# Patient Record
Sex: FEMALE | Race: WHITE | Employment: FULL TIME | URBAN - METROPOLITAN AREA
[De-identification: names, ages, dates, MRNs, and addresses within clinical notes are randomized per-mention and may not be internally consistent; named-entity substitution may affect disease eponyms.]

---

## 2021-01-19 ENCOUNTER — HOSPITAL ENCOUNTER (EMERGENCY)
Age: 56
Discharge: HOME | End: 2021-01-20
Payer: COMMERCIAL

## 2021-01-19 ENCOUNTER — APPOINTMENT (OUTPATIENT)
Dept: CT IMAGING | Age: 56
End: 2021-01-19
Payer: COMMERCIAL

## 2021-01-19 ENCOUNTER — APPOINTMENT (OUTPATIENT)
Dept: GENERAL RADIOLOGY | Age: 56
End: 2021-01-19
Payer: COMMERCIAL

## 2021-01-19 DIAGNOSIS — R31.9 URINARY TRACT INFECTION WITH HEMATURIA, SITE UNSPECIFIED: ICD-10-CM

## 2021-01-19 DIAGNOSIS — K80.20 CALCULUS OF GALLBLADDER WITHOUT CHOLECYSTITIS WITHOUT OBSTRUCTION: Primary | ICD-10-CM

## 2021-01-19 DIAGNOSIS — N39.0 URINARY TRACT INFECTION WITH HEMATURIA, SITE UNSPECIFIED: ICD-10-CM

## 2021-01-19 DIAGNOSIS — Z20.822 SUSPECTED COVID-19 VIRUS INFECTION: ICD-10-CM

## 2021-01-19 LAB
A/G RATIO: 1.3 (ref 1.1–2.2)
ALBUMIN SERPL-MCNC: 4 G/DL (ref 3.4–5)
ALP BLD-CCNC: 107 U/L (ref 40–129)
ALT SERPL-CCNC: 38 U/L (ref 10–40)
ANION GAP SERPL CALCULATED.3IONS-SCNC: 12 MMOL/L (ref 3–16)
AST SERPL-CCNC: 31 U/L (ref 15–37)
BACTERIA: ABNORMAL /HPF
BASOPHILS ABSOLUTE: 0.1 K/UL (ref 0–0.2)
BASOPHILS RELATIVE PERCENT: 1.1 %
BILIRUB SERPL-MCNC: 0.7 MG/DL (ref 0–1)
BILIRUBIN URINE: NEGATIVE
BLOOD, URINE: NEGATIVE
BUN BLDV-MCNC: 14 MG/DL (ref 7–20)
CALCIUM SERPL-MCNC: 8.5 MG/DL (ref 8.3–10.6)
CHLORIDE BLD-SCNC: 97 MMOL/L (ref 99–110)
CLARITY: CLEAR
CO2: 27 MMOL/L (ref 21–32)
COLOR: YELLOW
CREAT SERPL-MCNC: 1 MG/DL (ref 0.6–1.1)
EOSINOPHILS ABSOLUTE: 0 K/UL (ref 0–0.6)
EOSINOPHILS RELATIVE PERCENT: 0.4 %
EPITHELIAL CELLS, UA: ABNORMAL /HPF (ref 0–5)
GFR AFRICAN AMERICAN: >60
GFR NON-AFRICAN AMERICAN: 57
GLOBULIN: 3 G/DL
GLUCOSE BLD-MCNC: 141 MG/DL (ref 70–99)
GLUCOSE URINE: NEGATIVE MG/DL
HCT VFR BLD CALC: 36.3 % (ref 36–48)
HEMOGLOBIN: 12.3 G/DL (ref 12–16)
HYALINE CASTS: ABNORMAL /LPF (ref 0–2)
KETONES, URINE: NEGATIVE MG/DL
LEUKOCYTE ESTERASE, URINE: ABNORMAL
LIPASE: 71 U/L (ref 13–60)
LYMPHOCYTES ABSOLUTE: 1.5 K/UL (ref 1–5.1)
LYMPHOCYTES RELATIVE PERCENT: 23.9 %
MCH RBC QN AUTO: 27.6 PG (ref 26–34)
MCHC RBC AUTO-ENTMCNC: 33.8 G/DL (ref 31–36)
MCV RBC AUTO: 81.8 FL (ref 80–100)
MICROSCOPIC EXAMINATION: YES
MONOCYTES ABSOLUTE: 0.4 K/UL (ref 0–1.3)
MONOCYTES RELATIVE PERCENT: 6.9 %
NEUTROPHILS ABSOLUTE: 4.4 K/UL (ref 1.7–7.7)
NEUTROPHILS RELATIVE PERCENT: 67.7 %
NITRITE, URINE: NEGATIVE
PDW BLD-RTO: 14.8 % (ref 12.4–15.4)
PH UA: 6 (ref 5–8)
PLATELET # BLD: 230 K/UL (ref 135–450)
PMV BLD AUTO: 9.5 FL (ref 5–10.5)
POTASSIUM SERPL-SCNC: 4.2 MMOL/L (ref 3.5–5.1)
PROTEIN UA: NEGATIVE MG/DL
RBC # BLD: 4.44 M/UL (ref 4–5.2)
RBC UA: ABNORMAL /HPF (ref 0–4)
REASON FOR REJECTION: NORMAL
REJECTED TEST: NORMAL
SODIUM BLD-SCNC: 136 MMOL/L (ref 136–145)
SPECIFIC GRAVITY UA: 1.01 (ref 1–1.03)
TOTAL PROTEIN: 7 G/DL (ref 6.4–8.2)
TROPONIN: <0.01 NG/ML
URINE TYPE: ABNORMAL
UROBILINOGEN, URINE: 0.2 E.U./DL
WBC # BLD: 6.5 K/UL (ref 4–11)
WBC UA: ABNORMAL /HPF (ref 0–5)

## 2021-01-19 PROCEDURE — 81001 URINALYSIS AUTO W/SCOPE: CPT

## 2021-01-19 PROCEDURE — 83690 ASSAY OF LIPASE: CPT

## 2021-01-19 PROCEDURE — 80053 COMPREHEN METABOLIC PANEL: CPT

## 2021-01-19 PROCEDURE — 87077 CULTURE AEROBIC IDENTIFY: CPT

## 2021-01-19 PROCEDURE — 87086 URINE CULTURE/COLONY COUNT: CPT

## 2021-01-19 PROCEDURE — 84484 ASSAY OF TROPONIN QUANT: CPT

## 2021-01-19 PROCEDURE — 85025 COMPLETE CBC W/AUTO DIFF WBC: CPT

## 2021-01-19 PROCEDURE — 2580000003 HC RX 258: Performed by: PHYSICIAN ASSISTANT

## 2021-01-19 PROCEDURE — 74177 CT ABD & PELVIS W/CONTRAST: CPT

## 2021-01-19 PROCEDURE — 6360000004 HC RX CONTRAST MEDICATION: Performed by: PHYSICIAN ASSISTANT

## 2021-01-19 PROCEDURE — 71045 X-RAY EXAM CHEST 1 VIEW: CPT

## 2021-01-19 PROCEDURE — 87186 SC STD MICRODIL/AGAR DIL: CPT

## 2021-01-19 PROCEDURE — 6360000002 HC RX W HCPCS: Performed by: PHYSICIAN ASSISTANT

## 2021-01-19 PROCEDURE — 99284 EMERGENCY DEPT VISIT MOD MDM: CPT

## 2021-01-19 PROCEDURE — 96374 THER/PROPH/DIAG INJ IV PUSH: CPT

## 2021-01-19 PROCEDURE — 93005 ELECTROCARDIOGRAM TRACING: CPT | Performed by: PHYSICIAN ASSISTANT

## 2021-01-19 RX ORDER — LOSARTAN POTASSIUM 100 MG/1
1 TABLET ORAL DAILY
COMMUNITY
Start: 2020-09-04

## 2021-01-19 RX ORDER — CEPHALEXIN 250 MG/1
500 CAPSULE ORAL EVERY 6 HOURS SCHEDULED
Status: DISCONTINUED | OUTPATIENT
Start: 2021-01-20 | End: 2021-01-20 | Stop reason: HOSPADM

## 2021-01-19 RX ORDER — 0.9 % SODIUM CHLORIDE 0.9 %
1000 INTRAVENOUS SOLUTION INTRAVENOUS ONCE
Status: COMPLETED | OUTPATIENT
Start: 2021-01-19 | End: 2021-01-19

## 2021-01-19 RX ORDER — SEMAGLUTIDE 1.34 MG/ML
0.5 INJECTION, SOLUTION SUBCUTANEOUS WEEKLY
COMMUNITY
Start: 2020-10-07

## 2021-01-19 RX ORDER — ONDANSETRON 2 MG/ML
4 INJECTION INTRAMUSCULAR; INTRAVENOUS ONCE
Status: COMPLETED | OUTPATIENT
Start: 2021-01-19 | End: 2021-01-19

## 2021-01-19 RX ORDER — ATORVASTATIN CALCIUM 40 MG/1
1 TABLET, FILM COATED ORAL DAILY
COMMUNITY
Start: 2020-09-04

## 2021-01-19 RX ADMIN — ONDANSETRON 4 MG: 2 INJECTION INTRAMUSCULAR; INTRAVENOUS at 22:10

## 2021-01-19 RX ADMIN — IOPAMIDOL 75 ML: 755 INJECTION, SOLUTION INTRAVENOUS at 22:53

## 2021-01-19 RX ADMIN — SODIUM CHLORIDE 1000 ML: 9 INJECTION, SOLUTION INTRAVENOUS at 22:11

## 2021-01-20 VITALS
WEIGHT: 220 LBS | TEMPERATURE: 98.4 F | DIASTOLIC BLOOD PRESSURE: 71 MMHG | OXYGEN SATURATION: 94 % | HEART RATE: 84 BPM | HEIGHT: 66 IN | SYSTOLIC BLOOD PRESSURE: 128 MMHG | BODY MASS INDEX: 35.36 KG/M2 | RESPIRATION RATE: 16 BRPM

## 2021-01-20 LAB
EKG ATRIAL RATE: 94 BPM
EKG DIAGNOSIS: NORMAL
EKG P AXIS: 24 DEGREES
EKG P-R INTERVAL: 148 MS
EKG Q-T INTERVAL: 362 MS
EKG QRS DURATION: 98 MS
EKG QTC CALCULATION (BAZETT): 452 MS
EKG R AXIS: -12 DEGREES
EKG T AXIS: 49 DEGREES
EKG VENTRICULAR RATE: 94 BPM
SARS-COV-2: DETECTED

## 2021-01-20 PROCEDURE — 93010 ELECTROCARDIOGRAM REPORT: CPT | Performed by: INTERNAL MEDICINE

## 2021-01-20 PROCEDURE — U0003 INFECTIOUS AGENT DETECTION BY NUCLEIC ACID (DNA OR RNA); SEVERE ACUTE RESPIRATORY SYNDROME CORONAVIRUS 2 (SARS-COV-2) (CORONAVIRUS DISEASE [COVID-19]), AMPLIFIED PROBE TECHNIQUE, MAKING USE OF HIGH THROUGHPUT TECHNOLOGIES AS DESCRIBED BY CMS-2020-01-R: HCPCS

## 2021-01-20 PROCEDURE — 6370000000 HC RX 637 (ALT 250 FOR IP): Performed by: PHYSICIAN ASSISTANT

## 2021-01-20 RX ORDER — ONDANSETRON 4 MG/1
4 TABLET, ORALLY DISINTEGRATING ORAL EVERY 8 HOURS PRN
Qty: 30 TABLET | Refills: 0 | Status: SHIPPED | OUTPATIENT
Start: 2021-01-20

## 2021-01-20 RX ORDER — CEPHALEXIN 500 MG/1
500 CAPSULE ORAL 3 TIMES DAILY
Qty: 21 CAPSULE | Refills: 0 | Status: SHIPPED | OUTPATIENT
Start: 2021-01-20 | End: 2021-01-27

## 2021-01-20 RX ADMIN — CEPHALEXIN 500 MG: 250 CAPSULE ORAL at 00:19

## 2021-01-20 NOTE — ED PROVIDER NOTES
EKG: Sinus rhythm rate of 94 bpm.  Inferior Q waves. No ST elevation.       Monserrat Hines MD  01/19/21 7084

## 2021-01-20 NOTE — ED PROVIDER NOTES
[address] [facility] Emergency Department    CHIEF COMPLAINT  Abdominal Pain (L sided when she eats for 3 weeks / was told that she might have gallstones, unable to eat/drink, was sent by PCP)      [facility] SERVICE VISIT  Evaluated by MEGA. My supervising physician was available for consultation. EKG interpretation provided by attending physician. HISTORY OF PRESENT ILLNESS  Melinda Yeung is a 60 y.o. female who presents to the ED complaining of several week history of abdominal discomfort. Patient dropped off for evaluation. Patient states that she has had some left-sided abdominal discomfort for past several weeks which appears worse after eating. States that she has not been eating much secondary to pain. Has also had some nausea with vomiting. No diarrhea or constipation. She denies chest pain or shortness of breath. No cough. No headache, lightheadedness, dizziness or confusion. No leg pain or swelling. No recent travel, trauma or surgery. Denies abdominal surgeries. Denies alcohol use. Has had no fevers or chills. No other complaints, modifying factors or associated symptoms. Nursing notes reviewed. Past Medical History:   Diagnosis Date    CHF (congestive heart failure) (HCC)     GERD (gastroesophageal reflux disease)     Hypertension      Past Surgical History:   Procedure Laterality Date     SECTION       History reviewed. No pertinent family history.   Social History     Socioeconomic History    Marital status:      Spouse name: Not on file    Number of children: Not on file    Years of education: Not on file    Highest education level: Not on file   Occupational History    Not on file   Social Needs    Financial resource strain: Not on file    Food insecurity     Worry: Not on file     Inability: Not on file    Transportation needs     Medical: Not on file     Non-medical: Not on file   Tobacco Use  Smoking status: Never Smoker    Smokeless tobacco: Never Used   Substance and Sexual Activity    Alcohol use: No    Drug use: No    Sexual activity: Yes   Lifestyle    Physical activity     Days per week: Not on file     Minutes per session: Not on file    Stress: Not on file   Relationships    Social connections     Talks on phone: Not on file     Gets together: Not on file     Attends Yazdanism service: Not on file     Active member of club or organization: Not on file     Attends meetings of clubs or organizations: Not on file     Relationship status: Not on file    Intimate partner violence     Fear of current or ex partner: Not on file     Emotionally abused: Not on file     Physically abused: Not on file     Forced sexual activity: Not on file   Other Topics Concern    Not on file   Social History Narrative    Not on file     Current Facility-Administered Medications   Medication Dose Route Frequency Provider Last Rate Last Admin    cephALEXin (KEFLEX) capsule 500 mg  500 mg Oral 4 times per day FERNANDO Harman   500 mg at 01/20/21 0019     Current Outpatient Medications   Medication Sig Dispense Refill    ondansetron (ZOFRAN ODT) 4 MG disintegrating tablet Take 1 tablet by mouth every 8 hours as needed for Nausea or Vomiting 30 tablet 0    cephALEXin (KEFLEX) 500 MG capsule Take 1 capsule by mouth 3 times daily for 7 days 21 capsule 0    atorvastatin (LIPITOR) 40 MG tablet Take 1 tablet by mouth daily      metFORMIN (GLUCOPHAGE) 500 MG tablet Take 2 tablets by mouth 2 times daily      losartan (COZAAR) 100 MG tablet Take 1 tablet by mouth daily      Semaglutide,0.25 or 0.5MG/DOS, (OZEMPIC, 0.25 OR 0.5 MG/DOSE,) 2 MG/1.5ML SOPN Inject 0.5 mg into the skin once a week      carvedilol (COREG) 12.5 MG tablet Take 12.5 mg by mouth 2 times daily (with meals)      potassium chloride 10 MEQ/100ML Infuse 10 mEq intravenously once  Mag Aspart-Potassium Aspart (POTASSIUM & MAGNESIUM ASPARTAT) 250-250 MG CAPS Take by mouth      HYDROcodone-acetaminophen (NORCO) 5-325 MG per tablet Take 1 tablet by mouth every 8 hours as needed for Pain 15 tablet 0    naproxen (NAPROSYN) 500 MG tablet Take 1 tablet by mouth 2 times daily 30 tablet 0    aspirin 81 MG EC tablet Take 1 tablet by mouth daily.  esomeprazole (NEXIUM) 40 MG capsule Take 40 mg by mouth every morning (before breakfast).  prenatal vitamin (PRENATAL-S) 27-0.8 MG TABS Take 1 tablet by mouth daily. No Known Allergies    REVIEW OF SYSTEMS  10 systems reviewed, pertinent positives per HPI otherwise noted to be negative    PHYSICAL EXAM  /75   Pulse 91   Temp 99.1 °F (37.3 °C) (Oral)   Resp 17   Ht 5' 6\" (1.676 m)   Wt 220 lb (99.8 kg)   LMP 02/12/2016   SpO2 98%   BMI 35.51 kg/m²   GENERAL APPEARANCE: Awake and alert. Cooperative. No acute distress. HEAD: Normocephalic. Atraumatic. EYES: PERRL. EOM's grossly intact. ENT: Mucous membranes are moist.   NECK: Supple. No JVD. No tracheal tenderness or deviation. No crepitus. HEART: RRR. No murmurs. No chest wall tenderness. LUNGS: Respirations unlabored. CTAB. Good air exchange. Speaking comfortably in full sentences. No wheezing, rhonchi, rales. ABDOMEN: Soft. Non-distended. Upper abdominal tenderness without rigidity,  guarding or rebound. Negative Sharp's, McBurney's, Rovsing's. No fluids or ascites. No hernias or masses. Bowel sounds normal in all quadrants. No CVA tenderness. EXTREMITIES: No peripheral edema. Moves all extremities equally. All extremities neurovascularly intact. SKIN: Warm and dry. No acute rashes. NEUROLOGICAL: Alert and oriented. CN's 2-12 intact. No gross facial drooping. Strength 5/5, sensation intact. PSYCHIATRIC: Normal mood and affect.     RADIOLOGY  Ct Abdomen Pelvis W Iv Contrast Additional Contrast? None    Result Date: 1/19/2021 EXAMINATION: CT OF THE ABDOMEN AND PELVIS WITH CONTRAST 2021 9:49 pm TECHNIQUE: CT of the abdomen and pelvis was performed with the administration of intravenous contrast. Multiplanar reformatted images are provided for review. Dose modulation, iterative reconstruction, and/or weight based adjustment of the mA/kV was utilized to reduce the radiation dose to as low as reasonably achievable. COMPARISON: None. HISTORY: ORDERING SYSTEM PROVIDED HISTORY: abd pain TECHNOLOGIST PROVIDED HISTORY: Reason for exam:->abd pain Additional Contrast?->None Reason for Exam: LUQ pain that has been inconsistent for about 3 weeks. Currently does not have any pain Acuity: Acute Type of Exam: Initial Relevant Medical/Surgical History: hx of  FINDINGS: Lower Chest: Bibasilar ground-glass opacities are present. Differential considerations would include atypical/viral pneumonia. Trace bilateral pleural effusions are present, with subsegmental atelectasis present bilaterally. Small pericardial effusion is present. Calcified hilar lymph nodes are present bilaterally. Organs: Mild degree of hepatic steatosis is present. Cholelithiasis is present, without evidence of cholecystitis. The pancreas, spleen, adrenal glands, appear normal.  Cortical cyst formation is present on the left kidney. No intrarenal calculi or hydronephrosis is present. GI/Bowel: Stomach appears normal.  Small bowel appears normal without evidence of obstruction. No evidence of appendicitis is present. Scattered colonic diverticula are noted, without evidence of diverticulitis. Pelvis: Urinary bladder is nondistended. Uterus and adnexal regions appear normal. Peritoneum/Retroperitoneum: No free fluid or free air is present within the abdomen or pelvis. No aneurysm formation is present. No pathological adenopathy is present. Bones/Soft Tissues: No acute osseous abnormalities present. 1. Bibasilar ground-glass opacities, trace pleural effusions, and subsegmental atelectasis. Differential considerations for the ground-glass opacities would include atypical/viral 1 pneumonia 2. Cholelithiasis, without evidence of cholecystitis 3. Scattered colonic diverticula, without evidence of diverticulitis     Xr Chest Portable    Result Date: 1/19/2021  EXAMINATION: ONE XRAY VIEW OF THE CHEST 1/19/2021 9:02 pm COMPARISON: March 6, 2011 HISTORY: ORDERING SYSTEM PROVIDED HISTORY: n/v TECHNOLOGIST PROVIDED HISTORY: Reason for exam:->n/v Reason for Exam: n/v Acuity: Acute Type of Exam: Initial FINDINGS: Marginal inspiration is present. Faint opacities are present bilaterally, and may represent atypical/viral pneumonia versus bacterial pneumonia and atelectasis. Calcified granuloma is again visualized within the left upper lobe. Heart size is normal full level of inspiration. Tortuosity of the aorta is present. Fullness of the hilar regions is again visualized, similar compared to the prior study. Osseous structures are stable. Marginal inspiration, with faint opacities bilaterally.   Differential considerations would include atypical/viral pneumonia versus multifocal bacterial pneumonia and atelectasis     ED COURSE Patient received Zofran for pain, with good relief. Triage vitals showing slight tachycardia pulse rate 112. Low-grade temperature. Given a 1 L IV fluid bolus. Microscopic urinalysis with 21-50 white blood cells and 2+ bacteria. Given dose of Keflex. Urine cultures pending. CMP suggestive of some mild dehydration with decreased GFR at 57. BUN and creatinine 14 and 1.0. Lipase 71. Troponin less than 0.01. CBC without leukocytosis or anemia. Chest x-ray showing faint opacities bilaterally atypical/viral pneumonia versus multifocal bacterial pneumonia and atelectasis. EKG normal sinus rhythm without evidence for acute ischemic change. CT abdomen and pelvis confirming gallstones without evidence to suggest infectious process. Groundglass opacities again with potential for atypical or viral pneumonia. Patient tolerated p.o. challenge. Tachycardia and fever has resolved on reevaluation. She does feel comfortable with discharge home with general surgery referral for gallstones. Also sent home with antibiotics for UTI. Covid testing pending. Return precautions discussed and patient is in agreement and comfortable at discharge. A discussion was had with MsKonstantin Burnie Osler regarding abdominal pain, ED findings and recommendations for follow-up. Risk management discussed and shared decision making had with patient and/or surrogate. All questions were answered. Patient will follow up with PCP and general surgeon within 2 to 3 days for further evaluation/treatment. All questions answered. Patient will return to ED for new/worsening symptoms. Patient was sent home with a prescription for Zofran and Keflex.     MDM  Results for orders placed or performed during the hospital encounter of 01/19/21   CBC auto differential   Result Value Ref Range    WBC 6.5 4.0 - 11.0 K/uL    RBC 4.44 4.00 - 5.20 M/uL    Hemoglobin 12.3 12.0 - 16.0 g/dL    Hematocrit 36.3 36.0 - 48.0 %    MCV 81.8 80.0 - 100.0 fL MCH 27.6 26.0 - 34.0 pg    MCHC 33.8 31.0 - 36.0 g/dL    RDW 14.8 12.4 - 15.4 %    Platelets 780 727 - 846 K/uL    MPV 9.5 5.0 - 10.5 fL    Neutrophils % 67.7 %    Lymphocytes % 23.9 %    Monocytes % 6.9 %    Eosinophils % 0.4 %    Basophils % 1.1 %    Neutrophils Absolute 4.4 1.7 - 7.7 K/uL    Lymphocytes Absolute 1.5 1.0 - 5.1 K/uL    Monocytes Absolute 0.4 0.0 - 1.3 K/uL    Eosinophils Absolute 0.0 0.0 - 0.6 K/uL    Basophils Absolute 0.1 0.0 - 0.2 K/uL   SPECIMEN REJECTION   Result Value Ref Range    Rejected Test cmp     Reason for Rejection see below    Comprehensive Metabolic Panel   Result Value Ref Range    Sodium 136 136 - 145 mmol/L    Potassium 4.2 3.5 - 5.1 mmol/L    Chloride 97 (L) 99 - 110 mmol/L    CO2 27 21 - 32 mmol/L    Anion Gap 12 3 - 16    Glucose 141 (H) 70 - 99 mg/dL    BUN 14 7 - 20 mg/dL    CREATININE 1.0 0.6 - 1.1 mg/dL    GFR Non- 57 (A) >60    GFR African American >60 >60    Calcium 8.5 8.3 - 10.6 mg/dL    Total Protein 7.0 6.4 - 8.2 g/dL    Alb 4.0 3.4 - 5.0 g/dL    Albumin/Globulin Ratio 1.3 1.1 - 2.2    Total Bilirubin 0.7 0.0 - 1.0 mg/dL    Alkaline Phosphatase 107 40 - 129 U/L    ALT 38 10 - 40 U/L    AST 31 15 - 37 U/L    Globulin 3.0 g/dL   Urinalysis   Result Value Ref Range    Color, UA Yellow Straw/Yellow    Clarity, UA Clear Clear    Glucose, Ur Negative Negative mg/dL    Bilirubin Urine Negative Negative    Ketones, Urine Negative Negative mg/dL    Specific Gravity, UA 1.010 1.005 - 1.030    Blood, Urine Negative Negative    pH, UA 6.0 5.0 - 8.0    Protein, UA Negative Negative mg/dL    Urobilinogen, Urine 0.2 <2.0 E.U./dL    Nitrite, Urine Negative Negative    Leukocyte Esterase, Urine SMALL (A) Negative    Microscopic Examination YES     Urine Type NotGiven    Lipase   Result Value Ref Range    Lipase 71.0 (H) 13.0 - 60.0 U/L   Troponin   Result Value Ref Range    Troponin <0.01 <0.01 ng/mL   Microscopic Urinalysis   Result Value Ref Range Hyaline Casts, UA 0-2 0 - 2 /LPF    WBC, UA 21-50 (A) 0 - 5 /HPF    RBC, UA 0-2 0 - 4 /HPF    Epithelial Cells, UA 2-5 0 - 5 /HPF    Bacteria, UA 2+ (A) None Seen /HPF   EKG 12 Lead   Result Value Ref Range    Ventricular Rate 94 BPM    Atrial Rate 94 BPM    P-R Interval 148 ms    QRS Duration 98 ms    Q-T Interval 362 ms    QTc Calculation (Bazett) 452 ms    P Axis 24 degrees    R Axis -12 degrees    T Axis 49 degrees    Diagnosis       Normal sinus rhythmInferior infarct , age undeterminedAbnormal ECGWhen compared with ECG of 08-MAR-2011 06:07,T wave inversion no longer evident in Anterolateral leads     I estimate there is LOW risk for ACUTE APPENDICITIS, BOWEL OBSTRUCTION, CHOLECYSTITIS, DIVERTICULITIS, INCARCERATED HERNIA, PANCREATITIS, or PERFORATED BOWEL or ULCER, thus I consider the discharge disposition reasonable. Also, there is no evidence or peritonitis, sepsis, or toxicity. Viktor Barnett and I have discussed the diagnosis and risks, and we agree with discharging home to follow-up with their primary doctor. We also discussed returning to the Emergency Department immediately if new or worsening symptoms occur. We have discussed the symptoms which are most concerning (e.g., bloody stool, fever, changing or worsening pain, vomiting) that necessitate immediate return. FINAL Impression  1. Calculus of gallbladder without cholecystitis without obstruction    2. Urinary tract infection with hematuria, site unspecified    3. Suspected COVID-19 virus infection      Blood pressure 124/75, pulse 91, temperature 99.1 °F (37.3 °C), temperature source Oral, resp. rate 17, height 5' 6\" (1.676 m), weight 220 lb (99.8 kg), last menstrual period 02/12/2016, SpO2 98 %. DISPOSITION  Patient was discharged to home in good condition.          Odette Barrow, [address]  01/20/21 9781

## 2021-01-20 NOTE — ED NOTES
Discharge instructions reviewed with patient. Reviewed prescriptions with patient. No additional questions asked. Voiced understanding. Encouraged patient to follow up as discussed by the ED physician.      Gale Frausto RN  01/20/21 5541

## 2021-01-21 ENCOUNTER — CARE COORDINATION (OUTPATIENT)
Dept: CARE COORDINATION | Age: 56
End: 2021-01-21

## 2021-01-21 LAB
ORGANISM: ABNORMAL
URINE CULTURE, ROUTINE: ABNORMAL

## 2021-01-21 NOTE — CARE COORDINATION
Patient contacted regarding [id number] diagnosis\". Discussed COVID-19 related testing which was available at this time. Test results were positive. Patient informed of results, if available? Yes    Care Transition Nurse/ Ambulatory Care Manager contacted the patient by telephone to perform post discharge assessment. Call within 2 business days of discharge: Yes. Verified name and  with patient as identifiers. Provided introduction to self, and explanation of the CTN/ACM role, and reason for call due to risk factors for infection and/or exposure to COVID-19. Symptoms reviewed with patient who verbalized the following symptoms: no new symptoms and no worsening symptoms. Denies any concerns/symptoms. Pt states her focus is on health of her kids, specifically as r/t school d/t family self-quarantined. Due to no new or worsening symptoms encounter was not routed to provider for escalation. Discussed follow-up appointments. If no appointment was previously scheduled, appointment scheduling offered: Yes  [facility] follow up appointment(s): No future appointments. Non-[facility] follow up appointment(s):     Non-face-to-face services provided:  Obtained and reviewed discharge summary and/or continuity of care documents     Advance Care Planning:   Does patient have an Advance Directive:  not on file; education provided. Patient has following risk factors of: heart failure. CTN/ACM reviewed discharge instructions, medical action plan and red flags such as increased shortness of breath, increasing fever and signs of decompensation with patient who verbalized understanding. Discussed exposure protocols and quarantine with CDC Guidelines What to do if you are sick with coronavirus disease 2019.  Patient was given an opportunity for questions and concerns.  The patient agrees to contact the [facility] exposure line [phone], [facility] department [place] Department of Health: ([phone]) and PCP office for

## 2021-01-21 NOTE — CARE COORDINATION
Care Transition phone outreach s/p acute care visit 1.20.21  Left HIPAA compliant vm requesting return callback. Provided & repeated direct contact number to reach this nurse.

## 2021-01-21 NOTE — RESULT ENCOUNTER NOTE
Culture result reviewed, no further treatment needed. [facility] Smokers Quitline ([phone])

## 2023-01-13 ENCOUNTER — APPOINTMENT (OUTPATIENT)
Dept: CT IMAGING | Age: 58
DRG: 412 | End: 2023-01-13
Payer: COMMERCIAL

## 2023-01-13 ENCOUNTER — HOSPITAL ENCOUNTER (INPATIENT)
Age: 58
LOS: 4 days | Discharge: HOME | DRG: 412 | End: 2023-01-17
Attending: STUDENT IN AN ORGANIZED HEALTH CARE EDUCATION/TRAINING PROGRAM | Admitting: SURGERY
Payer: COMMERCIAL

## 2023-01-13 DIAGNOSIS — K81.0 ACUTE CHOLECYSTITIS: ICD-10-CM

## 2023-01-13 DIAGNOSIS — R74.01 TRANSAMINITIS: ICD-10-CM

## 2023-01-13 DIAGNOSIS — K80.50 BILIARY COLIC: ICD-10-CM

## 2023-01-13 DIAGNOSIS — G89.18 ACUTE POSTOPERATIVE PAIN OF ABDOMEN: ICD-10-CM

## 2023-01-13 DIAGNOSIS — R10.13 ABDOMINAL PAIN, EPIGASTRIC: Primary | ICD-10-CM

## 2023-01-13 DIAGNOSIS — R10.9 ACUTE POSTOPERATIVE PAIN OF ABDOMEN: ICD-10-CM

## 2023-01-13 LAB
A/G RATIO: 1 (ref 1.1–2.2)
ALBUMIN SERPL-MCNC: 3.8 G/DL (ref 3.4–5)
ALBUMIN SERPL-MCNC: 3.9 G/DL (ref 3.4–5)
ALP BLD-CCNC: 109 U/L (ref 40–129)
ALT SERPL-CCNC: 147 U/L (ref 10–40)
ANION GAP SERPL CALCULATED.3IONS-SCNC: 11 MMOL/L (ref 3–16)
ANION GAP SERPL CALCULATED.3IONS-SCNC: 17 MMOL/L (ref 3–16)
AST SERPL-CCNC: 364 U/L (ref 15–37)
BASOPHILS ABSOLUTE: 0.1 K/UL (ref 0–0.2)
BASOPHILS RELATIVE PERCENT: 0.5 %
BILIRUB SERPL-MCNC: 1.4 MG/DL (ref 0–1)
BILIRUB SERPL-MCNC: 2.8 MG/DL (ref 0–1)
BILIRUBIN DIRECT: 1.8 MG/DL (ref 0–0.3)
BILIRUBIN URINE: NEGATIVE
BILIRUBIN, INDIRECT: 1 MG/DL (ref 0–1)
BLOOD, URINE: NEGATIVE
BUN BLDV-MCNC: 15 MG/DL (ref 7–20)
BUN BLDV-MCNC: 18 MG/DL (ref 7–20)
CALCIUM SERPL-MCNC: 9.8 MG/DL (ref 8.3–10.6)
CALCIUM SERPL-MCNC: 9.9 MG/DL (ref 8.3–10.6)
CHLORIDE BLD-SCNC: 95 MMOL/L (ref 99–110)
CHLORIDE BLD-SCNC: 96 MMOL/L (ref 99–110)
CLARITY: CLEAR
CO2: 23 MMOL/L (ref 21–32)
CO2: 23 MMOL/L (ref 21–32)
COLOR: YELLOW
CREAT SERPL-MCNC: 0.7 MG/DL (ref 0.6–1.1)
CREAT SERPL-MCNC: 0.7 MG/DL (ref 0.6–1.1)
EOSINOPHILS ABSOLUTE: 0.1 K/UL (ref 0–0.6)
EOSINOPHILS RELATIVE PERCENT: 1.1 %
GFR SERPL CREATININE-BSD FRML MDRD: >60 ML/MIN/{1.73_M2}
GFR SERPL CREATININE-BSD FRML MDRD: >60 ML/MIN/{1.73_M2}
GLUCOSE BLD-MCNC: 204 MG/DL (ref 70–99)
GLUCOSE BLD-MCNC: 214 MG/DL (ref 70–99)
GLUCOSE URINE: NEGATIVE MG/DL
HCT VFR BLD CALC: 42.2 % (ref 36–48)
HEMOGLOBIN: 13.8 G/DL (ref 12–16)
KETONES, URINE: NEGATIVE MG/DL
LACTIC ACID: 4.1 MMOL/L (ref 0.4–2)
LACTIC ACID: 4.6 MMOL/L (ref 0.4–2)
LEUKOCYTE ESTERASE, URINE: NEGATIVE
LIPASE: 76 U/L (ref 13–60)
LYMPHOCYTES ABSOLUTE: 1.6 K/UL (ref 1–5.1)
LYMPHOCYTES RELATIVE PERCENT: 12.7 %
MCH RBC QN AUTO: 28.3 PG (ref 26–34)
MCHC RBC AUTO-ENTMCNC: 32.6 G/DL (ref 31–36)
MCV RBC AUTO: 86.7 FL (ref 80–100)
MICROSCOPIC EXAMINATION: NORMAL
MONOCYTES ABSOLUTE: 0.5 K/UL (ref 0–1.3)
MONOCYTES RELATIVE PERCENT: 4.2 %
NEUTROPHILS ABSOLUTE: 10.1 K/UL (ref 1.7–7.7)
NEUTROPHILS RELATIVE PERCENT: 81.5 %
NITRITE, URINE: NEGATIVE
PDW BLD-RTO: 15.2 % (ref 12.4–15.4)
PH UA: 6 (ref 5–8)
PHOSPHORUS: 3.1 MG/DL (ref 2.5–4.9)
PLATELET # BLD: 251 K/UL (ref 135–450)
PMV BLD AUTO: 9 FL (ref 5–10.5)
POTASSIUM REFLEX MAGNESIUM: 9.6 MMOL/L (ref 3.5–5.1)
POTASSIUM SERPL-SCNC: 3.8 MMOL/L (ref 3.5–5.1)
POTASSIUM SERPL-SCNC: 4.8 MMOL/L (ref 3.5–5.1)
PROTEIN UA: NEGATIVE MG/DL
RBC # BLD: 4.87 M/UL (ref 4–5.2)
SODIUM BLD-SCNC: 129 MMOL/L (ref 136–145)
SODIUM BLD-SCNC: 136 MMOL/L (ref 136–145)
SPECIFIC GRAVITY UA: <=1.005 (ref 1–1.03)
TOTAL PROTEIN: 7.8 G/DL (ref 6.4–8.2)
URINE REFLEX TO CULTURE: NORMAL
URINE TYPE: NORMAL
UROBILINOGEN, URINE: 0.2 E.U./DL
WBC # BLD: 12.4 K/UL (ref 4–11)

## 2023-01-13 PROCEDURE — 82247 BILIRUBIN TOTAL: CPT

## 2023-01-13 PROCEDURE — 2580000003 HC RX 258

## 2023-01-13 PROCEDURE — 83605 ASSAY OF LACTIC ACID: CPT

## 2023-01-13 PROCEDURE — 85025 COMPLETE CBC W/AUTO DIFF WBC: CPT

## 2023-01-13 PROCEDURE — 6360000002 HC RX W HCPCS

## 2023-01-13 PROCEDURE — 1200000000 HC SEMI PRIVATE

## 2023-01-13 PROCEDURE — 81003 URINALYSIS AUTO W/O SCOPE: CPT

## 2023-01-13 PROCEDURE — 96360 HYDRATION IV INFUSION INIT: CPT

## 2023-01-13 PROCEDURE — 74177 CT ABD & PELVIS W/CONTRAST: CPT

## 2023-01-13 PROCEDURE — 99285 EMERGENCY DEPT VISIT HI MDM: CPT

## 2023-01-13 PROCEDURE — 87150 DNA/RNA AMPLIFIED PROBE: CPT

## 2023-01-13 PROCEDURE — 36415 COLL VENOUS BLD VENIPUNCTURE: CPT

## 2023-01-13 PROCEDURE — 6360000002 HC RX W HCPCS: Performed by: STUDENT IN AN ORGANIZED HEALTH CARE EDUCATION/TRAINING PROGRAM

## 2023-01-13 PROCEDURE — 84132 ASSAY OF SERUM POTASSIUM: CPT

## 2023-01-13 PROCEDURE — 87040 BLOOD CULTURE FOR BACTERIA: CPT

## 2023-01-13 PROCEDURE — 6360000004 HC RX CONTRAST MEDICATION: Performed by: STUDENT IN AN ORGANIZED HEALTH CARE EDUCATION/TRAINING PROGRAM

## 2023-01-13 PROCEDURE — 80053 COMPREHEN METABOLIC PANEL: CPT

## 2023-01-13 PROCEDURE — 83690 ASSAY OF LIPASE: CPT

## 2023-01-13 PROCEDURE — 6370000000 HC RX 637 (ALT 250 FOR IP)

## 2023-01-13 PROCEDURE — 93005 ELECTROCARDIOGRAM TRACING: CPT | Performed by: SURGERY

## 2023-01-13 PROCEDURE — 82248 BILIRUBIN DIRECT: CPT

## 2023-01-13 PROCEDURE — 2580000003 HC RX 258: Performed by: STUDENT IN AN ORGANIZED HEALTH CARE EDUCATION/TRAINING PROGRAM

## 2023-01-13 PROCEDURE — 96361 HYDRATE IV INFUSION ADD-ON: CPT

## 2023-01-13 PROCEDURE — 99223 1ST HOSP IP/OBS HIGH 75: CPT | Performed by: SURGERY

## 2023-01-13 RX ORDER — SODIUM CHLORIDE 9 MG/ML
INJECTION, SOLUTION INTRAVENOUS PRN
Status: DISCONTINUED | OUTPATIENT
Start: 2023-01-13 | End: 2023-01-17 | Stop reason: HOSPADM

## 2023-01-13 RX ORDER — ONDANSETRON 2 MG/ML
4 INJECTION INTRAMUSCULAR; INTRAVENOUS EVERY 6 HOURS PRN
Status: DISCONTINUED | OUTPATIENT
Start: 2023-01-13 | End: 2023-01-17 | Stop reason: HOSPADM

## 2023-01-13 RX ORDER — SODIUM CHLORIDE 0.9 % (FLUSH) 0.9 %
10 SYRINGE (ML) INJECTION EVERY 12 HOURS SCHEDULED
Status: DISCONTINUED | OUTPATIENT
Start: 2023-01-13 | End: 2023-01-17 | Stop reason: HOSPADM

## 2023-01-13 RX ORDER — ENOXAPARIN SODIUM 100 MG/ML
40 INJECTION SUBCUTANEOUS DAILY
Status: CANCELLED | OUTPATIENT
Start: 2023-01-13

## 2023-01-13 RX ORDER — CARVEDILOL 12.5 MG/1
12.5 TABLET ORAL 2 TIMES DAILY WITH MEALS
Status: DISCONTINUED | OUTPATIENT
Start: 2023-01-14 | End: 2023-01-17 | Stop reason: HOSPADM

## 2023-01-13 RX ORDER — ENOXAPARIN SODIUM 100 MG/ML
40 INJECTION SUBCUTANEOUS DAILY
Status: DISCONTINUED | OUTPATIENT
Start: 2023-01-14 | End: 2023-01-14

## 2023-01-13 RX ORDER — ONDANSETRON 2 MG/ML
INJECTION INTRAMUSCULAR; INTRAVENOUS
Status: COMPLETED
Start: 2023-01-13 | End: 2023-01-13

## 2023-01-13 RX ORDER — ONDANSETRON 4 MG/1
4 TABLET, ORALLY DISINTEGRATING ORAL EVERY 8 HOURS PRN
Status: CANCELLED | OUTPATIENT
Start: 2023-01-13

## 2023-01-13 RX ORDER — SODIUM CHLORIDE 0.9 % (FLUSH) 0.9 %
5-40 SYRINGE (ML) INJECTION PRN
Status: DISCONTINUED | OUTPATIENT
Start: 2023-01-13 | End: 2023-01-17 | Stop reason: HOSPADM

## 2023-01-13 RX ORDER — ONDANSETRON 2 MG/ML
4 INJECTION INTRAMUSCULAR; INTRAVENOUS EVERY 6 HOURS PRN
Status: CANCELLED | OUTPATIENT
Start: 2023-01-13

## 2023-01-13 RX ORDER — ONDANSETRON 4 MG/1
4 TABLET, ORALLY DISINTEGRATING ORAL EVERY 8 HOURS PRN
Status: DISCONTINUED | OUTPATIENT
Start: 2023-01-13 | End: 2023-01-17 | Stop reason: HOSPADM

## 2023-01-13 RX ORDER — SODIUM CHLORIDE, SODIUM LACTATE, POTASSIUM CHLORIDE, AND CALCIUM CHLORIDE .6; .31; .03; .02 G/100ML; G/100ML; G/100ML; G/100ML
1000 INJECTION, SOLUTION INTRAVENOUS ONCE
Status: COMPLETED | OUTPATIENT
Start: 2023-01-13 | End: 2023-01-14

## 2023-01-13 RX ORDER — SODIUM CHLORIDE 0.9 % (FLUSH) 0.9 %
10 SYRINGE (ML) INJECTION PRN
Status: DISCONTINUED | OUTPATIENT
Start: 2023-01-13 | End: 2023-01-17 | Stop reason: HOSPADM

## 2023-01-13 RX ORDER — SODIUM CHLORIDE, SODIUM LACTATE, POTASSIUM CHLORIDE, AND CALCIUM CHLORIDE .6; .31; .03; .02 G/100ML; G/100ML; G/100ML; G/100ML
1000 INJECTION, SOLUTION INTRAVENOUS ONCE
Status: COMPLETED | OUTPATIENT
Start: 2023-01-13 | End: 2023-01-13

## 2023-01-13 RX ORDER — OXYCODONE HYDROCHLORIDE 5 MG/1
5 TABLET ORAL EVERY 4 HOURS PRN
Status: DISCONTINUED | OUTPATIENT
Start: 2023-01-13 | End: 2023-01-17 | Stop reason: HOSPADM

## 2023-01-13 RX ORDER — ONDANSETRON 2 MG/ML
4 INJECTION INTRAMUSCULAR; INTRAVENOUS ONCE
Status: COMPLETED | OUTPATIENT
Start: 2023-01-13 | End: 2023-01-13

## 2023-01-13 RX ORDER — OXYCODONE HYDROCHLORIDE 5 MG/1
10 TABLET ORAL EVERY 4 HOURS PRN
Status: DISCONTINUED | OUTPATIENT
Start: 2023-01-13 | End: 2023-01-17 | Stop reason: HOSPADM

## 2023-01-13 RX ORDER — SODIUM CHLORIDE, SODIUM LACTATE, POTASSIUM CHLORIDE, CALCIUM CHLORIDE 600; 310; 30; 20 MG/100ML; MG/100ML; MG/100ML; MG/100ML
INJECTION, SOLUTION INTRAVENOUS CONTINUOUS
Status: DISCONTINUED | OUTPATIENT
Start: 2023-01-14 | End: 2023-01-17 | Stop reason: HOSPADM

## 2023-01-13 RX ORDER — SODIUM CHLORIDE 0.9 % (FLUSH) 0.9 %
5-40 SYRINGE (ML) INJECTION EVERY 12 HOURS SCHEDULED
Status: DISCONTINUED | OUTPATIENT
Start: 2023-01-13 | End: 2023-01-17 | Stop reason: HOSPADM

## 2023-01-13 RX ORDER — ACETAMINOPHEN 325 MG/1
650 TABLET ORAL EVERY 4 HOURS PRN
Status: DISCONTINUED | OUTPATIENT
Start: 2023-01-13 | End: 2023-01-14

## 2023-01-13 RX ORDER — ATORVASTATIN CALCIUM 40 MG/1
40 TABLET, FILM COATED ORAL DAILY
Status: DISCONTINUED | OUTPATIENT
Start: 2023-01-14 | End: 2023-01-17 | Stop reason: HOSPADM

## 2023-01-13 RX ADMIN — PIPERACILLIN AND TAZOBACTAM 4500 MG: 4; .5 INJECTION, POWDER, FOR SOLUTION INTRAVENOUS at 19:28

## 2023-01-13 RX ADMIN — SODIUM CHLORIDE, POTASSIUM CHLORIDE, SODIUM LACTATE AND CALCIUM CHLORIDE: 600; 310; 30; 20 INJECTION, SOLUTION INTRAVENOUS at 22:01

## 2023-01-13 RX ADMIN — HYDROMORPHONE HYDROCHLORIDE 0.5 MG: 1 INJECTION, SOLUTION INTRAMUSCULAR; INTRAVENOUS; SUBCUTANEOUS at 14:44

## 2023-01-13 RX ADMIN — ONDANSETRON 4 MG: 2 INJECTION INTRAMUSCULAR; INTRAVENOUS at 14:41

## 2023-01-13 RX ADMIN — SODIUM CHLORIDE, POTASSIUM CHLORIDE, SODIUM LACTATE AND CALCIUM CHLORIDE 1000 ML: 600; 310; 30; 20 INJECTION, SOLUTION INTRAVENOUS at 19:42

## 2023-01-13 RX ADMIN — ACETAMINOPHEN 650 MG: 325 TABLET ORAL at 22:30

## 2023-01-13 RX ADMIN — SODIUM CHLORIDE, POTASSIUM CHLORIDE, SODIUM LACTATE AND CALCIUM CHLORIDE 1000 ML: 600; 310; 30; 20 INJECTION, SOLUTION INTRAVENOUS at 16:16

## 2023-01-13 RX ADMIN — SODIUM CHLORIDE, PRESERVATIVE FREE 10 ML: 5 INJECTION INTRAVENOUS at 21:49

## 2023-01-13 RX ADMIN — IOPAMIDOL 75 ML: 755 INJECTION, SOLUTION INTRAVENOUS at 15:54

## 2023-01-13 RX ADMIN — HYDROMORPHONE HYDROCHLORIDE 0.5 MG: 1 INJECTION, SOLUTION INTRAMUSCULAR; INTRAVENOUS; SUBCUTANEOUS at 16:17

## 2023-01-13 ASSESSMENT — ENCOUNTER SYMPTOMS
VOMITING: 1
SHORTNESS OF BREATH: 0
SORE THROAT: 0
ABDOMINAL PAIN: 1
COUGH: 0
NAUSEA: 1
DIARRHEA: 0

## 2023-01-13 ASSESSMENT — PAIN SCALES - GENERAL
PAINLEVEL_OUTOF10: 0
PAINLEVEL_OUTOF10: 0

## 2023-01-13 NOTE — ED PROVIDER NOTES
[address] [facility]          ATTENDING PHYSICIAN NOTE       Date of evaluation: 2023    Chief Complaint     Abdominal Pain      History of Present Illness     Nima Ramírez is a 59 y.o. female with history of HTN, GERD, CHF, prior gallbladder surgery with no prior cholecystectomy presenting with abdominal pain. Patient reports having acute onset of sharp 10/10 epigastric pain worse with movement and palpation associated with nausea and vomiting that started after driving home from [place]. Reports similar symptoms in the past in setting of gallstone problem. Denies abdominal surgeries other than . Denies chest pain, trouble breathing. Reports taking Zofran prior to arrival.  Denies fevers, chills, urinary symptoms or URI symptoms. Denies blood in stool or black tarry stool. Review of Systems     Review of Systems   Constitutional:  Negative for chills and fever. HENT:  Negative for congestion and sore throat. Eyes:  Negative for visual disturbance. Respiratory:  Negative for cough and shortness of breath. Cardiovascular:  Negative for chest pain. Gastrointestinal:  Positive for abdominal pain, nausea and vomiting. Negative for diarrhea. Genitourinary:  Negative for dysuria and frequency. Musculoskeletal:  Negative for arthralgias and myalgias. Neurological:  Negative for syncope. Psychiatric/Behavioral:  Negative for confusion. Past Medical, Surgical, Family, and Social History     She has a past medical history of CHF (congestive heart failure) ([address] GERD (gastroesophageal reflux disease), and Hypertension. She has a past surgical history that includes  section. Her family history is not on file. She reports that she has never smoked. She has never used smokeless tobacco. She reports that she does not drink alcohol and does not use drugs.     Medications     Previous Medications    ASPIRIN 81 MG EC TABLET    Take 1 tablet by mouth daily. ATORVASTATIN (LIPITOR) 40 MG TABLET    Take 1 tablet by mouth daily    CARVEDILOL (COREG) 12.5 MG TABLET    Take 12.5 mg by mouth 2 times daily (with meals)    ESOMEPRAZOLE (NEXIUM) 40 MG CAPSULE    Take 40 mg by mouth every morning (before breakfast). HYDROCODONE-ACETAMINOPHEN (NORCO) 5-325 MG PER TABLET    Take 1 tablet by mouth every 8 hours as needed for Pain    LOSARTAN (COZAAR) 100 MG TABLET    Take 1 tablet by mouth daily    MAG ASPART-POTASSIUM ASPART (POTASSIUM & MAGNESIUM ASPARTAT) 250-250 MG CAPS    Take by mouth    METFORMIN (GLUCOPHAGE) 500 MG TABLET    Take 2 tablets by mouth 2 times daily    NAPROXEN (NAPROSYN) 500 MG TABLET    Take 1 tablet by mouth 2 times daily    ONDANSETRON (ZOFRAN ODT) 4 MG DISINTEGRATING TABLET    Take 1 tablet by mouth every 8 hours as needed for Nausea or Vomiting    POTASSIUM CHLORIDE 10 MEQ/100ML    Infuse 10 mEq intravenously once    PRENATAL VITAMIN (PRENATAL-S) 27-0.8 MG TABS    Take 1 tablet by mouth daily. SEMAGLUTIDE,0.25 OR 0.5MG/DOS, (OZEMPIC, 0.25 OR 0.5 MG/DOSE,) 2 MG/1.5ML SOPN    Inject 0.5 mg into the skin once a week       Allergies     She has No Known Allergies. Physical Exam     INITIAL VITALS: BP: (!) 160/117, Temp: 98 °F (36.7 °C), Heart Rate: 84, Resp: 20, SpO2: 100 %   Physical Exam  GENERAL: Awake, alert. Uncomfortable appearing holding stomach leaning off side of stretcher  HEENT: Normocephalic, atraumatic. Conjunctiva clear, EOMI, no eye drainage. External ear normal. Nares patent with no drainage. Mucous membranes moist. Neck is supple, with full ROM. CARDIOVASCULAR: RRR, extremities warm and appear well-perfused. No peripheral edema, strong radial pulses bilaterally  RESPIRATORY: No increased WOB, good air movement, breath sounds CTAB, no wheezes, rhonchi, or crackles noted.   GI/ABDOMEN: Soft, non-distended, tenderness to palpation in epigastric and right upper quadrant with voluntary guarding with no rebound. MSK/EXTR: No deformities or cyanosis noted. SKIN: Warm and dry, no rashes. NEURO: No focal neurologic deficits, moving all four extremities. PSYCH: Normal affect, answers questions appropriately. Diagnostic Results     EKG   We reviewed interpreted by me shows normal sinus rhythm with rate of 98, narrow QRS with left axis deviation with no ST elevations or depressions with QTC of 487    RADIOLOGY:  CT ABDOMEN PELVIS W IV CONTRAST Additional Contrast? None   Final Result      1. Cholelithiasis. 2. There is mild dilatation of the gallbladder with respect to the prior study with no pericholecystic fluid, fat stranding or diffuse gallbladder edema. 3. Minimal thickening of the fundus may represent coexistent adenomyomatosis. However, this likely just represents indentation of the fundus along the anterior abdominal wall and simulated thickening when viewed on sagittal imaging.           LABS:   Results for orders placed or performed during the hospital encounter of 01/13/23   CBC with Auto Differential   Result Value Ref Range    WBC 12.4 (H) 4.0 - 11.0 K/uL    RBC 4.87 4.00 - 5.20 M/uL    Hemoglobin 13.8 12.0 - 16.0 g/dL    Hematocrit 42.2 36.0 - 48.0 %    MCV 86.7 80.0 - 100.0 fL    MCH 28.3 26.0 - 34.0 pg    MCHC 32.6 31.0 - 36.0 g/dL    RDW 15.2 12.4 - 15.4 %    Platelets 509 416 - 857 K/uL    MPV 9.0 5.0 - 10.5 fL    Neutrophils % 81.5 %    Lymphocytes % 12.7 %    Monocytes % 4.2 %    Eosinophils % 1.1 %    Basophils % 0.5 %    Neutrophils Absolute 10.1 (H) 1.7 - 7.7 K/uL    Lymphocytes Absolute 1.6 1.0 - 5.1 K/uL    Monocytes Absolute 0.5 0.0 - 1.3 K/uL    Eosinophils Absolute 0.1 0.0 - 0.6 K/uL    Basophils Absolute 0.1 0.0 - 0.2 K/uL   Lactic Acid   Result Value Ref Range    Lactic Acid 4.1 (HH) 0.4 - 2.0 mmol/L   Lipase   Result Value Ref Range    Lipase 76.0 (H) 13.0 - 60.0 U/L   CMP w/ Reflex to MG   Result Value Ref Range    Sodium 129 (L) 136 - 145 mmol/L    Potassium reflex Magnesium 9.6 (HH) 3.5 - 5.1 mmol/L    Chloride 95 (L) 99 - 110 mmol/L    CO2 23 21 - 32 mmol/L    Anion Gap 11 3 - 16    Glucose 214 (H) 70 - 99 mg/dL    BUN 15 7 - 20 mg/dL    Creatinine 0.7 0.6 - 1.1 mg/dL    Est, Glom Filt Rate >60 >60    Calcium 9.9 8.3 - 10.6 mg/dL    Total Protein 7.8 6.4 - 8.2 g/dL    Albumin 3.9 3.4 - 5.0 g/dL    Albumin/Globulin Ratio 1.0 (L) 1.1 - 2.2    Total Bilirubin 1.4 (H) 0.0 - 1.0 mg/dL    Alkaline Phosphatase 109 40 - 129 U/L     (H) 10 - 40 U/L     (H) 15 - 37 U/L   Urinalysis with Reflex to Culture    Specimen: Urine   Result Value Ref Range    Color, UA Yellow Straw/Yellow    Clarity, UA Clear Clear    Glucose, Ur Negative Negative mg/dL    Bilirubin Urine Negative Negative    Ketones, Urine Negative Negative mg/dL    Specific Gravity, UA <=1.005 1.005 - 1.030    Blood, Urine Negative Negative    pH, UA 6.0 5.0 - 8.0    Protein, UA Negative Negative mg/dL    Urobilinogen, Urine 0.2 <2.0 E.U./dL    Nitrite, Urine Negative Negative    Leukocyte Esterase, Urine Negative Negative    Microscopic Examination Not Indicated     Urine Type Voided     Urine Reflex to Culture Not Indicated    Potassium   Result Value Ref Range    Potassium 4.8 3.5 - 5.1 mmol/L       ED BEDSIDE ULTRASOUND:  No results found. RECENT VITALS:  BP: (!) 140/74,Temp: 98 °F (36.7 °C), Heart Rate: 84, Resp: 20, SpO2: 100 %     Procedures         ED Course     Nursing Notes, Past Medical Hx, Past Surgical Hx, Social Hx,Allergies, and Family Hx were reviewed.     ED Course as of 01/13/23 1939 Fri Jan 13, 2023   1521 WBC(!): 12.4 [ED]   1544 ALT(!): 147 [ED]   1544 AST(!): 364 [ED]   1544 Lipase(!): 76.0 [ED]   1545 Creatinine: 0.7 [ED]      ED Course User Index  [ED] Jina Ariza MD       patient was given the following medications:  Orders Placed This Encounter   Medications    HYDROmorphone (DILAUDID) injection 0.5 mg    ondansetron (ZOFRAN) 4 MG/2ML injection     Leander Story: mihaela override ondansetron (ZOFRAN) injection 4 mg    lactated ringers bolus    HYDROmorphone (DILAUDID) injection 0.5 mg    iopamidol (ISOVUE-370) 76 % injection 75 mL    DISCONTD: piperacillin-tazobactam (ZOSYN) 3,375 mg in dextrose 5 % 50 mL IVPB (mini-bag)     Order Specific Question:   Antimicrobial Indications     Answer:   Intra-Abdominal Infection    piperacillin-tazobactam (ZOSYN) 3,375 mg in dextrose 5 % 50 mL IVPB extended infusion (mini-bag)     Order Specific Question:   Antimicrobial Indications     Answer:   Intra-Abdominal Infection    DISCONTD: piperacillin-tazobactam (ZOSYN) 4,500 mg in dextrose 5 % 100 mL IVPB (mini-bag)     Order Specific Question:   Antimicrobial Indications     Answer:   Intra-Abdominal Infection    lactated ringers bolus    piperacillin-tazobactam (ZOSYN) 4,500 mg in dextrose 5 % 100 mL IVPB (mini-bag)     Order Specific Question:   Antimicrobial Indications     Answer:   Intra-Abdominal Infection       CONSULTS:  IP CONSULT TO [address] [place] [address] DECISIONMAKING / ASSESSMENT / Wai Severance is a 59 y.o. female with above-stated medical history including biliary colic, CHF, HTN, GERD presenting with acute onset of epigastric pain, nausea and vomiting. Uncomfortable appearing female bent over at side of stretcher holding abdomen. Differential concerning for but not limited to ACS, arrhythmia, gastritis, cholecystitis, hepatitis, ascending cholangitis. IV access was obtained and labs were sent. Patient was given pain and nausea sent and started on IV fluids. Labs with elevated lactate, transaminitis, mild lipase elevation and leukocytosis to 12.4. Urinalysis negative for ketones and UTI. Initial potassium hemolyzed with repeat potassium of 4.8 with no hyperkalemic EKG changes noted. CT abdomen and pelvis was ordered and discussed with radiology with concern for distended gallbladder with multiple stones.   On repeat assessment, patient reported transient provement of symptoms but had return of abdominal pain and was given additional dose of IV pain medication with improvement. Blood cultures were drawn and patient was started on Zosyn. General surgery was consulted and patient was admitted to their service for further evaluation and management of biliary colic versus early acute cholecystitis. Patient care was signed out to inpatient team.    Clinical Impression     1. Abdominal pain, epigastric    2. Transaminitis    3. Biliary colic        Disposition     PATIENT REFERRED TO:  No follow-up provider specified.     DISCHARGE MEDICATIONS:  New Prescriptions    No medications on file       DISPOSITION Admitted 01/13/2023 06:07:32 PM        Judah Meadows MD  01/13/23 1939

## 2023-01-13 NOTE — ED TRIAGE NOTES
Pt presents to the ED for abd pain that is mid epigastric, hx of complex gallstone complications. +N/V that started this morning. Hx of similar prior episodes. VSS per EMS. GCS 15, A&Ox4.

## 2023-01-13 NOTE — H&P
Resident History and Physical   General Surgery      Chief Complaint:   Abdominal pain     History of Present Illness:    Homero Urbano is a 59 y.o. female with Hx of Cardiomyopathy, HTN, T2DM, w/ multiple c-sections who presents with epigastric pain. Pt states that she was shopping at the grocery store earlier today when she developed epigastric pain with nausea suddenly, and subsequently two episodes of emesis. In the ED vital signs are unremarkable, however, lab results are notable for a WBC of 12.4, , , Total bilirubin 1.4, Lipase 76. CT A&P shows a dilated gallbladder with cholelithiasis without signs of cholecystitis. In the ED, the pt states her pain has improved since receiving PRN medications in the ED. Past Medical History:        Diagnosis Date    CHF (congestive heart failure) (HCC)     GERD (gastroesophageal reflux disease)     Hypertension        Past Surgical History:           Procedure Laterality Date     SECTION         Allergies:  Patient has no known allergies. Medications:   Home Meds  No current facility-administered medications on file prior to encounter.      Current Outpatient Medications on File Prior to Encounter   Medication Sig Dispense Refill    ondansetron (ZOFRAN ODT) 4 MG disintegrating tablet Take 1 tablet by mouth every 8 hours as needed for Nausea or Vomiting 30 tablet 0    atorvastatin (LIPITOR) 40 MG tablet Take 1 tablet by mouth daily      metFORMIN (GLUCOPHAGE) 500 MG tablet Take 2 tablets by mouth 2 times daily      losartan (COZAAR) 100 MG tablet Take 1 tablet by mouth daily      Semaglutide,0.25 or 0.5MG/DOS, (OZEMPIC, 0.25 OR 0.5 MG/DOSE,) 2 MG/1.5ML SOPN Inject 0.5 mg into the skin once a week      carvedilol (COREG) 12.5 MG tablet Take 12.5 mg by mouth 2 times daily (with meals)      potassium chloride 10 MEQ/100ML Infuse 10 mEq intravenously once      Mag Aspart-Potassium Aspart (POTASSIUM & MAGNESIUM ASPARTAT) 250-250 MG CAPS Take by mouth      HYDROcodone-acetaminophen (NORCO) 5-325 MG per tablet Take 1 tablet by mouth every 8 hours as needed for Pain 15 tablet 0    naproxen (NAPROSYN) 500 MG tablet Take 1 tablet by mouth 2 times daily 30 tablet 0    aspirin 81 MG EC tablet Take 1 tablet by mouth daily. esomeprazole (NEXIUM) 40 MG capsule Take 40 mg by mouth every morning (before breakfast). prenatal vitamin (PRENATAL-S) 27-0.8 MG TABS Take 1 tablet by mouth daily. Current Meds  No current facility-administered medications for this encounter. Family History:   No family history on file. Social History:   TOBACCO:   reports that she has never smoked. She has never used smokeless tobacco.  ETOH:   reports no history of alcohol use. DRUGS:   reports no history of drug use. Review of Systems:   A 14 point review of systems was conducted, significant findings as noted in HPI. All other systems negative. Physical Exam:    Vitals:    01/13/23 1436 01/13/23 1509   BP: (!) 160/117 (!) 140/74   Pulse: 84    Resp: 20    Temp: 98 °F (36.7 °C)    TempSrc: Oral    SpO2: 100%        General appearance: no acute distress, shivering and covering   HEENT: Normocephalic/atraumatic; PERRL; no scleral icterus; trachea midline  Chest/Lungs: Normal effort on RA  Cardiovascular: Regular rate and rhythm; no murmurs, no rubs, no gallops  Abdomen: Non-distended;  soft; minimally-tender; no guarding, no rigidity  Skin: warm and dry; no exanthems  Extremities: no edema; no cyanosis  Neuro: A&Ox3; no focal deficits; sensation intact    Laboratory Results:    CBC:   Recent Labs     01/13/23  1507   WBC 12.4*   HGB 13.8   HCT 42.2   MCV 86.7        BMP:   Recent Labs     01/13/23  1507 01/13/23  1628   *  --    K 9.6* 4.8   CL 95*  --    CO2 23  --    BUN 15  --    CREATININE 0.7  --      PT/INR: No results for input(s): PROTIME, INR in the last 72 hours. APTT: No results for input(s): APTT in the last 72 hours.   Liver Profile:  Lab Results   Component Value Date/Time     01/13/2023 03:07 PM     01/13/2023 03:07 PM    BILIDIR 0.26 03/08/2011 06:10 AM    BILITOT 1.4 01/13/2023 03:07 PM    ALKPHOS 109 01/13/2023 03:07 PM     Lab Results   Component Value Date/Time    CHOL 185 03/07/2011 05:50 AM    HDL 59 03/07/2011 05:50 AM    TRIG 180 03/07/2011 05:50 AM     UA:   Lab Results   Component Value Date/Time    COLORU Yellow 01/19/2021 10:33 PM    PHUR 6.0 01/19/2021 10:33 PM    WBCUA 21-50 01/19/2021 10:33 PM    RBCUA 0-2 01/19/2021 10:33 PM    BACTERIA 2+ 01/19/2021 10:33 PM    CLARITYU Clear 01/19/2021 10:33 PM    SPECGRAV 1.010 01/19/2021 10:33 PM    LEUKOCYTESUR SMALL 01/19/2021 10:33 PM    UROBILINOGEN 0.2 01/19/2021 10:33 PM    BILIRUBINUR Negative 01/19/2021 10:33 PM    BLOODU Negative 01/19/2021 10:33 PM    GLUCOSEU Negative 01/19/2021 10:33 PM       Imaging:   CT ABDOMEN PELVIS W IV CONTRAST Additional Contrast? None   Final Result      1. Cholelithiasis. 2. There is mild dilatation of the gallbladder with respect to the prior study with no pericholecystic fluid, fat stranding or diffuse gallbladder edema. 3. Minimal thickening of the fundus may represent coexistent adenomyomatosis. However, this likely just represents indentation of the fundus along the anterior abdominal wall and simulated thickening when viewed on sagittal imaging. Assessment/Plan:  Christopher Jones is a 59 y.o. female with Hx of Cardiomyopathy, HTN, T2DM, w/ multiple c-sections who presents with epigastric pain. Pt is doing better now however with a hx of symptomatic cholelithiasis pt would likely benefit from cholecystectomy. Pt indicates she is interested in surgery but would like to wait and see how she feels. - Admit pt  - NPO with sips  - Zosyn  - Consent pt for procedure in the event pt wants to move forward with surgery.      Stuart CamarilloDO   PGY1, General Surgery  01/13/23  7:03 PM  Contact via [employer]

## 2023-01-14 ENCOUNTER — ANESTHESIA (OUTPATIENT)
Dept: OPERATING ROOM | Age: 58
End: 2023-01-14
Payer: COMMERCIAL

## 2023-01-14 ENCOUNTER — APPOINTMENT (OUTPATIENT)
Dept: GENERAL RADIOLOGY | Age: 58
DRG: 412 | End: 2023-01-14
Payer: COMMERCIAL

## 2023-01-14 ENCOUNTER — ANESTHESIA EVENT (OUTPATIENT)
Dept: OPERATING ROOM | Age: 58
End: 2023-01-14
Payer: COMMERCIAL

## 2023-01-14 LAB
ABO/RH: NORMAL
ALBUMIN SERPL-MCNC: 3.4 G/DL (ref 3.4–5)
ALBUMIN SERPL-MCNC: 3.4 G/DL (ref 3.4–5)
ALP BLD-CCNC: 147 U/L (ref 40–129)
ALP BLD-CCNC: 151 U/L (ref 40–129)
ALT SERPL-CCNC: 627 U/L (ref 10–40)
ALT SERPL-CCNC: 762 U/L (ref 10–40)
ANION GAP SERPL CALCULATED.3IONS-SCNC: 11 MMOL/L (ref 3–16)
ANTIBODY SCREEN: NORMAL
AST SERPL-CCNC: 551 U/L (ref 15–37)
AST SERPL-CCNC: 852 U/L (ref 15–37)
BASOPHILS ABSOLUTE: 0 K/UL (ref 0–0.2)
BASOPHILS RELATIVE PERCENT: 0.1 %
BILIRUB SERPL-MCNC: 3.7 MG/DL (ref 0–1)
BILIRUB SERPL-MCNC: 4.6 MG/DL (ref 0–1)
BILIRUBIN DIRECT: 2.5 MG/DL (ref 0–0.3)
BILIRUBIN DIRECT: 3.6 MG/DL (ref 0–0.3)
BILIRUBIN, INDIRECT: 1 MG/DL (ref 0–1)
BILIRUBIN, INDIRECT: 1.2 MG/DL (ref 0–1)
BUN BLDV-MCNC: 20 MG/DL (ref 7–20)
CALCIUM SERPL-MCNC: 8.9 MG/DL (ref 8.3–10.6)
CHLORIDE BLD-SCNC: 95 MMOL/L (ref 99–110)
CO2: 29 MMOL/L (ref 21–32)
CREAT SERPL-MCNC: 0.9 MG/DL (ref 0.6–1.1)
EKG ATRIAL RATE: 98 BPM
EKG DIAGNOSIS: NORMAL
EKG P AXIS: 50 DEGREES
EKG P-R INTERVAL: 156 MS
EKG Q-T INTERVAL: 382 MS
EKG QRS DURATION: 96 MS
EKG QTC CALCULATION (BAZETT): 487 MS
EKG R AXIS: -42 DEGREES
EKG T AXIS: 61 DEGREES
EKG VENTRICULAR RATE: 98 BPM
EOSINOPHILS ABSOLUTE: 0 K/UL (ref 0–0.6)
EOSINOPHILS RELATIVE PERCENT: 0 %
GFR SERPL CREATININE-BSD FRML MDRD: >60 ML/MIN/{1.73_M2}
GLUCOSE BLD-MCNC: 197 MG/DL (ref 70–99)
GLUCOSE BLD-MCNC: 227 MG/DL (ref 70–99)
HCG(URINE) PREGNANCY TEST: NEGATIVE
HCT VFR BLD CALC: 34.7 % (ref 36–48)
HEMOGLOBIN: 11.5 G/DL (ref 12–16)
INR BLD: 1.35 (ref 0.87–1.14)
LACTIC ACID: 2.5 MMOL/L (ref 0.4–2)
LACTIC ACID: 3 MMOL/L (ref 0.4–2)
LYMPHOCYTES ABSOLUTE: 0.5 K/UL (ref 1–5.1)
LYMPHOCYTES RELATIVE PERCENT: 3 %
MAGNESIUM: 1.6 MG/DL (ref 1.8–2.4)
MCH RBC QN AUTO: 27.8 PG (ref 26–34)
MCHC RBC AUTO-ENTMCNC: 33.2 G/DL (ref 31–36)
MCV RBC AUTO: 83.8 FL (ref 80–100)
MONOCYTES ABSOLUTE: 0.6 K/UL (ref 0–1.3)
MONOCYTES RELATIVE PERCENT: 3.4 %
NEUTROPHILS ABSOLUTE: 15.9 K/UL (ref 1.7–7.7)
NEUTROPHILS RELATIVE PERCENT: 93.5 %
PDW BLD-RTO: 14.9 % (ref 12.4–15.4)
PERFORMED ON: ABNORMAL
PHOSPHORUS: 6.3 MG/DL (ref 2.5–4.9)
PLATELET # BLD: 207 K/UL (ref 135–450)
PMV BLD AUTO: 9 FL (ref 5–10.5)
POTASSIUM SERPL-SCNC: 3.5 MMOL/L (ref 3.5–5.1)
PROTHROMBIN TIME: 16.6 SEC (ref 11.7–14.5)
RBC # BLD: 4.14 M/UL (ref 4–5.2)
SODIUM BLD-SCNC: 135 MMOL/L (ref 136–145)
TOTAL PROTEIN: 5.4 G/DL (ref 6.4–8.2)
TOTAL PROTEIN: 5.9 G/DL (ref 6.4–8.2)
WBC # BLD: 17 K/UL (ref 4–11)

## 2023-01-14 PROCEDURE — 2580000003 HC RX 258

## 2023-01-14 PROCEDURE — 47564 LAPARO CHOLECYSTECTOMY/EXPLR: CPT | Performed by: SURGERY

## 2023-01-14 PROCEDURE — 7100000000 HC PACU RECOVERY - FIRST 15 MIN: Performed by: SURGERY

## 2023-01-14 PROCEDURE — 7100000001 HC PACU RECOVERY - ADDTL 15 MIN: Performed by: SURGERY

## 2023-01-14 PROCEDURE — 6360000002 HC RX W HCPCS

## 2023-01-14 PROCEDURE — 2580000003 HC RX 258: Performed by: SURGERY

## 2023-01-14 PROCEDURE — 3600000004 HC SURGERY LEVEL 4 BASE: Performed by: SURGERY

## 2023-01-14 PROCEDURE — 84703 CHORIONIC GONADOTROPIN ASSAY: CPT

## 2023-01-14 PROCEDURE — 2500000003 HC RX 250 WO HCPCS: Performed by: ANESTHESIOLOGY

## 2023-01-14 PROCEDURE — 2500000003 HC RX 250 WO HCPCS: Performed by: SURGERY

## 2023-01-14 PROCEDURE — 3700000001 HC ADD 15 MINUTES (ANESTHESIA): Performed by: SURGERY

## 2023-01-14 PROCEDURE — 6370000000 HC RX 637 (ALT 250 FOR IP)

## 2023-01-14 PROCEDURE — 3700000000 HC ANESTHESIA ATTENDED CARE: Performed by: SURGERY

## 2023-01-14 PROCEDURE — 1200000000 HC SEMI PRIVATE

## 2023-01-14 PROCEDURE — 0FT44ZZ RESECTION OF GALLBLADDER, PERCUTANEOUS ENDOSCOPIC APPROACH: ICD-10-PCS | Performed by: STUDENT IN AN ORGANIZED HEALTH CARE EDUCATION/TRAINING PROGRAM

## 2023-01-14 PROCEDURE — 80069 RENAL FUNCTION PANEL: CPT

## 2023-01-14 PROCEDURE — 83735 ASSAY OF MAGNESIUM: CPT

## 2023-01-14 PROCEDURE — 86901 BLOOD TYPING SEROLOGIC RH(D): CPT

## 2023-01-14 PROCEDURE — 83605 ASSAY OF LACTIC ACID: CPT

## 2023-01-14 PROCEDURE — 74300 X-RAY BILE DUCTS/PANCREAS: CPT

## 2023-01-14 PROCEDURE — C1757 CATH, THROMBECTOMY/EMBOLECT: HCPCS | Performed by: SURGERY

## 2023-01-14 PROCEDURE — 6360000002 HC RX W HCPCS: Performed by: ANESTHESIOLOGY

## 2023-01-14 PROCEDURE — 80076 HEPATIC FUNCTION PANEL: CPT

## 2023-01-14 PROCEDURE — 93005 ELECTROCARDIOGRAM TRACING: CPT | Performed by: SURGERY

## 2023-01-14 PROCEDURE — 85025 COMPLETE CBC W/AUTO DIFF WBC: CPT

## 2023-01-14 PROCEDURE — 85610 PROTHROMBIN TIME: CPT

## 2023-01-14 PROCEDURE — 88304 TISSUE EXAM BY PATHOLOGIST: CPT

## 2023-01-14 PROCEDURE — 6360000004 HC RX CONTRAST MEDICATION: Performed by: SURGERY

## 2023-01-14 PROCEDURE — 99232 SBSQ HOSP IP/OBS MODERATE 35: CPT | Performed by: SURGERY

## 2023-01-14 PROCEDURE — 3600000014 HC SURGERY LEVEL 4 ADDTL 15MIN: Performed by: SURGERY

## 2023-01-14 PROCEDURE — 86900 BLOOD TYPING SEROLOGIC ABO: CPT

## 2023-01-14 PROCEDURE — 86850 RBC ANTIBODY SCREEN: CPT

## 2023-01-14 PROCEDURE — 2709999900 HC NON-CHARGEABLE SUPPLY: Performed by: SURGERY

## 2023-01-14 PROCEDURE — 36415 COLL VENOUS BLD VENIPUNCTURE: CPT

## 2023-01-14 PROCEDURE — 80074 ACUTE HEPATITIS PANEL: CPT

## 2023-01-14 PROCEDURE — A4217 STERILE WATER/SALINE, 500 ML: HCPCS | Performed by: SURGERY

## 2023-01-14 PROCEDURE — 0FJB4ZZ INSPECTION OF HEPATOBILIARY DUCT, PERCUTANEOUS ENDOSCOPIC APPROACH: ICD-10-PCS | Performed by: STUDENT IN AN ORGANIZED HEALTH CARE EDUCATION/TRAINING PROGRAM

## 2023-01-14 RX ORDER — FENTANYL CITRATE 50 UG/ML
INJECTION, SOLUTION INTRAMUSCULAR; INTRAVENOUS PRN
Status: DISCONTINUED | OUTPATIENT
Start: 2023-01-14 | End: 2023-01-14 | Stop reason: SDUPTHER

## 2023-01-14 RX ORDER — SODIUM CHLORIDE 9 MG/ML
INJECTION, SOLUTION INTRAVENOUS PRN
Status: DISCONTINUED | OUTPATIENT
Start: 2023-01-14 | End: 2023-01-14

## 2023-01-14 RX ORDER — BUPIVACAINE HYDROCHLORIDE 5 MG/ML
INJECTION, SOLUTION EPIDURAL; INTRACAUDAL PRN
Status: DISCONTINUED | OUTPATIENT
Start: 2023-01-14 | End: 2023-01-14 | Stop reason: ALTCHOICE

## 2023-01-14 RX ORDER — OXYCODONE HYDROCHLORIDE 5 MG/1
5 TABLET ORAL
Status: DISCONTINUED | OUTPATIENT
Start: 2023-01-14 | End: 2023-01-14

## 2023-01-14 RX ORDER — LIDOCAINE HYDROCHLORIDE 20 MG/ML
INJECTION, SOLUTION INTRAVENOUS PRN
Status: DISCONTINUED | OUTPATIENT
Start: 2023-01-14 | End: 2023-01-14 | Stop reason: SDUPTHER

## 2023-01-14 RX ORDER — SODIUM CHLORIDE 0.9 % (FLUSH) 0.9 %
5-40 SYRINGE (ML) INJECTION PRN
Status: DISCONTINUED | OUTPATIENT
Start: 2023-01-14 | End: 2023-01-14

## 2023-01-14 RX ORDER — MEPERIDINE HYDROCHLORIDE 25 MG/ML
12.5 INJECTION INTRAMUSCULAR; INTRAVENOUS; SUBCUTANEOUS EVERY 5 MIN PRN
Status: DISCONTINUED | OUTPATIENT
Start: 2023-01-14 | End: 2023-01-14

## 2023-01-14 RX ORDER — LABETALOL HYDROCHLORIDE 5 MG/ML
10 INJECTION, SOLUTION INTRAVENOUS
Status: DISCONTINUED | OUTPATIENT
Start: 2023-01-14 | End: 2023-01-14

## 2023-01-14 RX ORDER — MAGNESIUM SULFATE IN WATER 40 MG/ML
2000 INJECTION, SOLUTION INTRAVENOUS ONCE
Status: COMPLETED | OUTPATIENT
Start: 2023-01-14 | End: 2023-01-14

## 2023-01-14 RX ORDER — POTASSIUM CHLORIDE 7.45 MG/ML
10 INJECTION INTRAVENOUS
Status: DISPENSED | OUTPATIENT
Start: 2023-01-14 | End: 2023-01-14

## 2023-01-14 RX ORDER — ROCURONIUM BROMIDE 10 MG/ML
INJECTION, SOLUTION INTRAVENOUS PRN
Status: DISCONTINUED | OUTPATIENT
Start: 2023-01-14 | End: 2023-01-14 | Stop reason: SDUPTHER

## 2023-01-14 RX ORDER — PROCHLORPERAZINE EDISYLATE 5 MG/ML
5 INJECTION INTRAMUSCULAR; INTRAVENOUS
Status: DISCONTINUED | OUTPATIENT
Start: 2023-01-14 | End: 2023-01-14

## 2023-01-14 RX ORDER — SUCCINYLCHOLINE/SOD CL,ISO/PF 200MG/10ML
SYRINGE (ML) INTRAVENOUS PRN
Status: DISCONTINUED | OUTPATIENT
Start: 2023-01-14 | End: 2023-01-14 | Stop reason: SDUPTHER

## 2023-01-14 RX ORDER — MAGNESIUM HYDROXIDE 1200 MG/15ML
LIQUID ORAL CONTINUOUS PRN
Status: COMPLETED | OUTPATIENT
Start: 2023-01-14 | End: 2023-01-14

## 2023-01-14 RX ORDER — SODIUM CHLORIDE, SODIUM LACTATE, POTASSIUM CHLORIDE, AND CALCIUM CHLORIDE .6; .31; .03; .02 G/100ML; G/100ML; G/100ML; G/100ML
IRRIGANT IRRIGATION PRN
Status: DISCONTINUED | OUTPATIENT
Start: 2023-01-14 | End: 2023-01-14 | Stop reason: ALTCHOICE

## 2023-01-14 RX ORDER — PROPOFOL 10 MG/ML
INJECTION, EMULSION INTRAVENOUS PRN
Status: DISCONTINUED | OUTPATIENT
Start: 2023-01-14 | End: 2023-01-14 | Stop reason: SDUPTHER

## 2023-01-14 RX ORDER — HYDRALAZINE HYDROCHLORIDE 20 MG/ML
10 INJECTION INTRAMUSCULAR; INTRAVENOUS
Status: DISCONTINUED | OUTPATIENT
Start: 2023-01-14 | End: 2023-01-14

## 2023-01-14 RX ORDER — SODIUM CHLORIDE, SODIUM LACTATE, POTASSIUM CHLORIDE, AND CALCIUM CHLORIDE .6; .31; .03; .02 G/100ML; G/100ML; G/100ML; G/100ML
1000 INJECTION, SOLUTION INTRAVENOUS ONCE
Status: COMPLETED | OUTPATIENT
Start: 2023-01-14 | End: 2023-01-14

## 2023-01-14 RX ORDER — ONDANSETRON 2 MG/ML
INJECTION INTRAMUSCULAR; INTRAVENOUS PRN
Status: DISCONTINUED | OUTPATIENT
Start: 2023-01-14 | End: 2023-01-14 | Stop reason: SDUPTHER

## 2023-01-14 RX ORDER — ONDANSETRON 2 MG/ML
4 INJECTION INTRAMUSCULAR; INTRAVENOUS
Status: DISCONTINUED | OUTPATIENT
Start: 2023-01-14 | End: 2023-01-14

## 2023-01-14 RX ORDER — SODIUM CHLORIDE 0.9 % (FLUSH) 0.9 %
5-40 SYRINGE (ML) INJECTION EVERY 12 HOURS SCHEDULED
Status: DISCONTINUED | OUTPATIENT
Start: 2023-01-14 | End: 2023-01-14

## 2023-01-14 RX ADMIN — ENOXAPARIN SODIUM 40 MG: 100 INJECTION SUBCUTANEOUS at 09:44

## 2023-01-14 RX ADMIN — HYDROMORPHONE HYDROCHLORIDE 0.5 MG: 1 INJECTION, SOLUTION INTRAMUSCULAR; INTRAVENOUS; SUBCUTANEOUS at 16:27

## 2023-01-14 RX ADMIN — ACETAMINOPHEN 650 MG: 325 TABLET ORAL at 12:04

## 2023-01-14 RX ADMIN — PIPERACILLIN SODIUM AND TAZOBACTAM SODIUM 3375 MG: 3; .375 INJECTION, POWDER, LYOPHILIZED, FOR SOLUTION INTRAVENOUS at 18:53

## 2023-01-14 RX ADMIN — ROCURONIUM BROMIDE 40 MG: 50 INJECTION INTRAVENOUS at 14:01

## 2023-01-14 RX ADMIN — Medication 200 MG: at 13:55

## 2023-01-14 RX ADMIN — PIPERACILLIN SODIUM AND TAZOBACTAM SODIUM 3375 MG: 3; .375 INJECTION, POWDER, LYOPHILIZED, FOR SOLUTION INTRAVENOUS at 12:08

## 2023-01-14 RX ADMIN — ATORVASTATIN CALCIUM 40 MG: 40 TABLET, FILM COATED ORAL at 09:44

## 2023-01-14 RX ADMIN — POTASSIUM CHLORIDE 10 MEQ: 10 INJECTION, SOLUTION INTRAVENOUS at 13:11

## 2023-01-14 RX ADMIN — FENTANYL CITRATE 100 MCG: 50 INJECTION, SOLUTION INTRAMUSCULAR; INTRAVENOUS at 14:30

## 2023-01-14 RX ADMIN — MAGNESIUM SULFATE HEPTAHYDRATE 2000 MG: 40 INJECTION, SOLUTION INTRAVENOUS at 08:34

## 2023-01-14 RX ADMIN — POTASSIUM CHLORIDE 10 MEQ: 10 INJECTION, SOLUTION INTRAVENOUS at 09:43

## 2023-01-14 RX ADMIN — ROCURONIUM BROMIDE 10 MG: 50 INJECTION INTRAVENOUS at 13:55

## 2023-01-14 RX ADMIN — ACETAMINOPHEN 650 MG: 325 TABLET ORAL at 20:07

## 2023-01-14 RX ADMIN — ONDANSETRON 4 MG: 2 INJECTION INTRAMUSCULAR; INTRAVENOUS at 15:58

## 2023-01-14 RX ADMIN — FENTANYL CITRATE 100 MCG: 50 INJECTION, SOLUTION INTRAMUSCULAR; INTRAVENOUS at 13:55

## 2023-01-14 RX ADMIN — CARVEDILOL 12.5 MG: 12.5 TABLET, FILM COATED ORAL at 18:51

## 2023-01-14 RX ADMIN — PIPERACILLIN SODIUM AND TAZOBACTAM SODIUM 3375 MG: 3; .375 INJECTION, POWDER, LYOPHILIZED, FOR SOLUTION INTRAVENOUS at 13:55

## 2023-01-14 RX ADMIN — POTASSIUM CHLORIDE 10 MEQ: 10 INJECTION, SOLUTION INTRAVENOUS at 10:49

## 2023-01-14 RX ADMIN — PIPERACILLIN SODIUM AND TAZOBACTAM SODIUM 3375 MG: 3; .375 INJECTION, POWDER, LYOPHILIZED, FOR SOLUTION INTRAVENOUS at 04:24

## 2023-01-14 RX ADMIN — POTASSIUM CHLORIDE 10 MEQ: 10 INJECTION, SOLUTION INTRAVENOUS at 12:05

## 2023-01-14 RX ADMIN — SODIUM CHLORIDE, POTASSIUM CHLORIDE, SODIUM LACTATE AND CALCIUM CHLORIDE 1000 ML: 600; 310; 30; 20 INJECTION, SOLUTION INTRAVENOUS at 08:35

## 2023-01-14 RX ADMIN — LIDOCAINE HYDROCHLORIDE 100 MG: 20 INJECTION, SOLUTION INTRAVENOUS at 13:55

## 2023-01-14 RX ADMIN — SUGAMMADEX 200 MG: 100 INJECTION, SOLUTION INTRAVENOUS at 16:01

## 2023-01-14 RX ADMIN — PROPOFOL 200 MG: 10 INJECTION, EMULSION INTRAVENOUS at 13:55

## 2023-01-14 RX ADMIN — SODIUM CHLORIDE, POTASSIUM CHLORIDE, SODIUM LACTATE AND CALCIUM CHLORIDE: 600; 310; 30; 20 INJECTION, SOLUTION INTRAVENOUS at 16:15

## 2023-01-14 ASSESSMENT — PAIN DESCRIPTION - FREQUENCY: FREQUENCY: INTERMITTENT

## 2023-01-14 ASSESSMENT — PAIN DESCRIPTION - ONSET: ONSET: SUDDEN

## 2023-01-14 ASSESSMENT — PAIN SCALES - GENERAL
PAINLEVEL_OUTOF10: 0
PAINLEVEL_OUTOF10: 3
PAINLEVEL_OUTOF10: 0
PAINLEVEL_OUTOF10: 7
PAINLEVEL_OUTOF10: 5
PAINLEVEL_OUTOF10: 0
PAINLEVEL_OUTOF10: 5
PAINLEVEL_OUTOF10: 0

## 2023-01-14 ASSESSMENT — PAIN DESCRIPTION - LOCATION
LOCATION: ABDOMEN

## 2023-01-14 ASSESSMENT — PAIN DESCRIPTION - PAIN TYPE
TYPE: SURGICAL PAIN
TYPE: SURGICAL PAIN
TYPE: ACUTE PAIN;SURGICAL PAIN
TYPE: SURGICAL PAIN

## 2023-01-14 ASSESSMENT — PAIN - FUNCTIONAL ASSESSMENT: PAIN_FUNCTIONAL_ASSESSMENT: ACTIVITIES ARE NOT PREVENTED

## 2023-01-14 ASSESSMENT — PAIN DESCRIPTION - ORIENTATION: ORIENTATION: RIGHT;LEFT;UPPER

## 2023-01-14 ASSESSMENT — PAIN DESCRIPTION - DESCRIPTORS
DESCRIPTORS: ACHING;SORE
DESCRIPTORS: ACHING;DISCOMFORT

## 2023-01-14 NOTE — PROGRESS NOTES
Following secure message sent to surgical resident:    Patient recently admitted for cholelithiasis. Patient BP running low at 81/45 and HR at 123. Vitals were stable in ED with BP 752L/625B systolic. She did get 2L bolus that completed here. Do you want to give more fluids. She remains asymptomatic with lowered BP. Temp was elevated but came down to 99.7. Please advise.     Electronically signed by Dl Rainey RN on 1/13/2023 at 11:54 PM

## 2023-01-14 NOTE — DISCHARGE INSTRUCTIONS
Extra Heart Failure sites:   https://Seadev-FermenSys.TopTechPhoto/ --- this is American Heart Association interactive Healthier Living with Heart Failure guidebook. Please copy and paste link into search bar. Use your mouse to scroll through the pages. Lots and lots of info / tips    HF [place] mita  --- free smart phone mita available for [place] and [employer]. Use your phone to track sodium / fluid intake,  symptoms, weight, etc.    [employer] - website-- [employer] is a dialysis company. All dialysis patients follow a renal diet which IS low sodium!! This website offers free seasonal cookbooks. Each quarter, they will release 25-30 new recipes with a breakdown of calories, sodium, glucose, etc    www.eatingwell.com/recipes -- more free recipes      ACTIVITY  - No lifting >8lbs or a gallon of milk for 2 weeks. - No driving while on narcotics  Otherwise, you can drive when you can sit comfortably behind the steering wheel and can slam on the brake without it hurting or turn the wheel sharply without it hurting. Practice this when sitting the car with it parked in your driveway before trying to drive. Preventing Pneumonia   --use Incentive spirometer (IS-the breathing thing that the hospital gave you) every couple of hours. This will help prevent you from getting pneumonia. You want to do this for the next couple of weeks until you can take deep breaths without it hurting. Bathing  Hygiene is important so that you do not get an infection  - May shower, let water run over incision sites. No soaking in tub, hot tub, or pool. Do not submerge incision site in water.   - Notify MD immediately if experierencing fevers/chills, nausea/vomiting, pus-like discharge from incision sites, redness swelling, or warmth to incisions. Diet:  -Diet general  - Follow up with Dr. Melisa Montez in 2 weeks - call [phone] for appointment.    Follow up with gastroenterologist for removal of stent  Fior Dias MD, MD  [address] [place],[address]  [phone]    Pain management:   Unless informed of any restrictions by your primary care physician, please use your preferred over-the-counter pain reliever as your primary pain medication. If you have pain that persists despite over-the-counter pain medications, you have been provided with a prescription for an opioid/narcotic pain reliever (Percocet). Be aware that this medication is a combination of opioid/narcotic and acetaminophen (the main ingredient in Tylenol); therefore, it will contribute to your daily limit of 4,000mg acetaminophen. No driving or operating machinery while taking opioid/narcotic medications. Bowel Regimen:   Opioid/Narcotic pain relievers have a common side effect of constipation; therefore, you have been provided with a prescription for a stool softener, Docusate (Colace). These medications are intended to help prevent you from experiencing this very common side effect and also help to regulate your bowels after surgery. Pain medication: It is important to be comfortable so that you can walk around and take part in caring for yourself but it is also important to wean off pain medications as quickly as you can    Pain medication can be constipating. It is ok to take a stool softner or milk of magnesia as directed on bottle.

## 2023-01-14 NOTE — PROGRESS NOTES
4 Eyes Skin Assessment     NAME:  Shen Hearing OF BIRTH:  1965  MEDICAL RECORD NUMBER:  [id number]    The patient is being assessed for  Admission    I agree that One RN have performed a thorough Head to Toe Skin Assessment on the patient. ALL assessment sites listed below have been assessed. Areas assessed by both nurses:    Head, Face, Ears, Shoulders, Back, Chest, Arms, Elbows, Hands, Sacrum. Buttock, Coccyx, Ischium, and Legs. Feet and Heels        Does the Patient have a Wound?  No noted wound(s)       Jus Prevention initiated by RN: No   Wound Care Orders initiated by RN: No    Pressure Injury (Stage 3,4, Unstageable, DTI, NWPT, and Complex wounds) if present place referral order by RN under : No    New and Established Ostomies, if present place, referral order under : No      Nurse 1 eSignature: Electronically signed by José Luis Traore RN on 1/13/23 at 10:57 PM EST    **SHARE this note so that the co-signing nurse is able to place an eSignature**    Nurse 2 eSignature: {Esignature:[id number]}

## 2023-01-14 NOTE — ED NOTES
Report called to 3S, receiving RN updated on POC and pt history. Pt is agreeable and stable at this time. No questions or concerns at this time. DIVINA.       Bhakti. Henry 55, [facility]  01/13/23 8437

## 2023-01-14 NOTE — BRIEF OP NOTE
Brief Postoperative Note      Patient: Henry Dial  YOB: 1965  MRN: [id number]    Date of Procedure: 1/14/2023    Pre-Op Diagnosis: ACUTE CHOLECYSTITIS    Post-Op Diagnosis: Same       Procedure(s):  CHOLECYSTECTOMY LAPAROSCOPIC WITH INTRA-OPERATIVE CHOLANGIOGRAM, TRANSCYSTIC CATHETER BILE DUCT EXPLORATION    Surgeon(s):  Chip Bray MD    Assistant:  Resident: Fab Chadwick DO    Anesthesia: General    Estimated Blood Loss (mL): Minimal    Complications: None    Specimens:   ID Type Source Tests Collected by Time Destination   A : GALLBLADDER AND CONTENTS Tissue Tissue SURGICAL PATHOLOGY Chip Bray MD 1/14/2023 1513        Implants:  * No implants in log *      Drains: * No LDAs found *    Findings:   Acute cholecystitis, transcystic duct CBD exploration with Chao catheter resulting in 4 stones retrieved, purulence expressed, IOC with filling defect initially and improved after flushing, but possible retained stone.      Electronically signed by Fab Chadwick DO on 1/14/2023 at 4:09 PM

## 2023-01-14 NOTE — ANESTHESIA PRE PROCEDURE
Department of Anesthesiology  Preprocedure Note       Name:  Tanja Host   Age:  59 y.o.  :  1965                                          MRN:  [id number]         Date:  2023      Surgeon: Philomena Leblanc):  Yair Allan MD    Procedure: Procedure(s):  CHOLECYSTECTOMY LAPAROSCOPIC    Medications prior to admission:   Prior to Admission medications    Medication Sig Start Date End Date Taking? Authorizing Provider   ondansetron (ZOFRAN ODT) 4 MG disintegrating tablet Take 1 tablet by mouth every 8 hours as needed for Nausea or Vomiting 21   FERNANDO Osorio   atorvastatin (LIPITOR) 40 MG tablet Take 1 tablet by mouth daily 20   Historical Provider, MD   metFORMIN (GLUCOPHAGE) 500 MG tablet Take 2 tablets by mouth 2 times daily 20   Historical Provider, MD   losartan (COZAAR) 100 MG tablet Take 1 tablet by mouth daily 20   Historical Provider, MD   Semaglutide,0.25 or 0.5MG/DOS, (OZEMPIC, 0.25 OR 0.5 MG/DOSE,) 2 MG/1.5ML SOPN Inject 0.5 mg into the skin once a week 10/7/20   Historical Provider, MD   carvedilol (COREG) 12.5 MG tablet Take 12.5 mg by mouth 2 times daily (with meals)    Historical Provider, MD   potassium chloride 10 MEQ/100ML Infuse 10 mEq intravenously once  Patient not taking: Reported on 2023    Historical Provider, MD   Mag Aspart-Potassium Aspart (POTASSIUM & MAGNESIUM ASPARTAT) 250-250 MG CAPS Take by mouth  Patient not taking: Reported on 2023    Historical Provider, MD   HYDROcodone-acetaminophen (NORCO) 5-325 MG per tablet Take 1 tablet by mouth every 8 hours as needed for Pain  Patient not taking: Reported on 2023   FERNANDO Osorio   naproxen (NAPROSYN) 500 MG tablet Take 1 tablet by mouth 2 times daily  Patient not taking: Reported on 2023   FERNANDO Osorio   aspirin 81 MG EC tablet Take 1 tablet by mouth daily.   Patient not taking: Reported on 2023 3/9/11   Mina Steiner MD   esomeprazole (SongzaOriska tidy) 40 MG capsule Take 40 mg by mouth every morning (before breakfast). Patient not taking: Reported on 1/13/2023    Historical Provider, MD   prenatal vitamin (PRENATAL-S) 27-0.8 MG TABS Take 1 tablet by mouth daily.   Patient not taking: Reported on 1/13/2023    Historical Provider, MD       Current medications:    Current Facility-Administered Medications   Medication Dose Route Frequency Provider Last Rate Last Admin    potassium chloride 10 mEq/100 mL IVPB (Peripheral Line)  10 mEq IntraVENous Q1H Classie Section,  mL/hr at 01/14/23 1205 10 mEq at 01/14/23 1205    atorvastatin (LIPITOR) tablet 40 mg  40 mg Oral Daily Prabha Rayne Maribel, DO   40 mg at 01/14/23 0944    carvedilol (COREG) tablet 12.5 mg  12.5 mg Oral BID WC Prabha Rayne Maribel, DO        sodium chloride flush 0.9 % injection 10 mL  10 mL IntraVENous 2 times per day Leona Pata, DO   10 mL at 01/13/23 2149    sodium chloride flush 0.9 % injection 10 mL  10 mL IntraVENous PRN Prabha Rayne [place], DO        0.9 % sodium chloride infusion   IntraVENous PRN Prabha Rayne [place], DO        sodium chloride flush 0.9 % injection 5-40 mL  5-40 mL IntraVENous 2 times per day Leona Pata, DO        sodium chloride flush 0.9 % injection 5-40 mL  5-40 mL IntraVENous PRN Prabha Rayne [place], DO        0.9 % sodium chloride infusion   IntraVENous PRN Prabha Rayne Maribel, DO        ondansetron (ZOFRAN-ODT) disintegrating tablet 4 mg  4 mg Oral Q8H PRN Prabha Powerker, DO        Or    [facility]) injection 4 mg  4 mg IntraVENous Q6H PRN Prabha Rayne Maribel, DO        enoxaparin (LOVENOX) injection 40 mg  40 mg SubCUTAneous Daily Prabha Rayne [place], DO   40 mg at 01/14/23 0944    acetaminophen (TYLENOL) tablet 650 mg  650 mg Oral Q4H PRN Prabha Rayne Maribel, DO   650 mg at 01/14/23 1204    oxyCODONE (ROXICODONE) immediate release tablet 5 mg  5 mg Oral Q4H PRN Prabha Rayne [place], DO        Or    oxyCODONE (ROXICODONE) immediate release tablet 10 mg  10 mg Oral Q4H PRN Lonnie López, DO       • HYDROmorphone (DILAUDID) injection 0.25 mg  0.25 mg IntraVENous Q3H PRN Lonnie López DO        Or   • HYDROmorphone (DILAUDID) injection 0.5 mg  0.5 mg IntraVENous Q3H PRN Lonnie López DO       • lactated ringers infusion   IntraVENous Continuous Lonnie López  mL/hr at 23 0423 Rate Verify at 23 0423   • piperacillin-tazobactam (ZOSYN) 3,375 mg in dextrose 5 % 50 mL IVPB extended infusion (mini-bag)  3,375 mg IntraVENous Q8H Lonnie López DO 12.5 mL/hr at 23 1208 3,375 mg at 23 1208       Allergies:  No Known Allergies    Problem List:    Patient Active Problem List   Diagnosis Code   • CHF (congestive heart failure) ([facility]) I50.9   • Hypertension I10   • GERD (gastroesophageal reflux disease) K21.9   • Cardiomyopathy, peripartum, antepartum O90.3   • Acute cholecystitis K81.0       Past Medical History:        Diagnosis Date   • Cardiomyopathy ([facility])    • CHF (congestive heart failure) ([facility])    • DM (diabetes mellitus) ([facility])    • GERD (gastroesophageal reflux disease)    • Hypertension        Past Surgical History:        Procedure Laterality Date   •  SECTION         Social History:    Social History     Tobacco Use   • Smoking status: Never   • Smokeless tobacco: Never   Substance Use Topics   • Alcohol use: No                                Counseling given: Not Answered      Vital Signs (Current):   Vitals:    23 0406 23 0737 23 0900 23 1157   BP: 121/76 109/64  124/73   Pulse: 97 90  (!) 107   Resp: 18 17  16   Temp: 98.6 °F (37 °C) 100.2 °F (37.9 °C) 99.1 °F (37.3 °C) (!) 100.5 °F (38.1 °C)   TempSrc: Oral Oral  Oral   SpO2: 97% 94%  94%   Weight: 240 lb 8.4 oz (109.1 kg)      Height:                                                  BP Readings from Last 3 Encounters:   23 124/73   21 128/71   16 135/68       NPO Status:                                                                           BMI:   Wt Readings from Last 3 Encounters:   01/14/23 240 lb 8.4 oz (109.1 kg)   01/19/21 220 lb (99.8 kg)   02/20/16 250 lb (113.4 kg)     Body mass index is 38.82 kg/m². CBC:   Lab Results   Component Value Date/Time    WBC 17.0 01/14/2023 06:06 AM    RBC 4.14 01/14/2023 06:06 AM    HGB 11.5 01/14/2023 06:06 AM    HCT 34.7 01/14/2023 06:06 AM    MCV 83.8 01/14/2023 06:06 AM    RDW 14.9 01/14/2023 06:06 AM     01/14/2023 06:06 AM       CMP:   Lab Results   Component Value Date/Time     01/14/2023 06:06 AM    K 3.5 01/14/2023 06:06 AM    K 9.6 01/13/2023 03:07 PM    CL 95 01/14/2023 06:06 AM    CO2 29 01/14/2023 06:06 AM    BUN 20 01/14/2023 06:06 AM    CREATININE 0.9 01/14/2023 06:06 AM    GFRAA >60 01/19/2021 09:36 PM    GFRAA >60 04/02/2011 12:01 PM    AGRATIO 1.0 01/13/2023 03:07 PM    LABGLOM >60 01/14/2023 06:06 AM    GLUCOSE 227 01/14/2023 06:06 AM    PROT 5.4 01/14/2023 10:55 AM    PROT 6.6 03/08/2011 06:10 AM    CALCIUM 8.9 01/14/2023 06:06 AM    BILITOT 4.6 01/14/2023 10:55 AM    ALKPHOS 147 01/14/2023 10:55 AM     01/14/2023 10:55 AM     01/14/2023 10:55 AM       POC Tests: No results for input(s): POCGLU, POCNA, POCK, POCCL, POCBUN, POCHEMO, POCHCT in the last 72 hours.     Coags:   Lab Results   Component Value Date/Time    PROTIME 16.6 01/14/2023 06:06 AM    INR 1.35 01/14/2023 06:06 AM    APTT 28.8 03/07/2011 01:10 AM       HCG (If Applicable):   Lab Results   Component Value Date    PREGTESTUR Negative 01/14/2023        ABGs: No results found for: PHART, PO2ART, OHH8VJD, GHC5AHK, BEART, X6GLDMIV     Type & Screen (If Applicable):  No results found for: LABABO, LABRH    Drug/Infectious Status (If Applicable):  No results found for: HIV, HEPCAB    COVID-19 Screening (If Applicable):   Lab Results   Component Value Date/Time    COVID19 Detected 01/20/2021 12:55 AM           Anesthesia Evaluation  Patient summary reviewed and Nursing notes reviewed no history of anesthetic complications:   Airway: Mallampati: II  TM distance: >3 FB   Neck ROM: full  Mouth opening: > = 3 FB   Dental: normal exam         Pulmonary:Negative Pulmonary ROS and normal exam                               Cardiovascular:    (+) hypertension:, CHF:,                   Neuro/Psych:   Negative Neuro/Psych ROS              GI/Hepatic/Renal:   (+) GERD:,           Endo/Other:    (+) DiabetesType II DM, , .                 Abdominal:             Vascular: negative vascular ROS. Other Findings:           Anesthesia Plan      general     ASA 3       Induction: intravenous. MIPS: Postoperative opioids intended and Prophylactic antiemetics administered. Anesthetic plan and risks discussed with patient.         Attending anesthesiologist reviewed and agrees with Preprocedure content                Roya Badillo MD   1/14/2023

## 2023-01-14 NOTE — PROGRESS NOTES
General Surgery   Daily Progress Note  Patient: Primo Bradford      CC: acute cholecystitis     SUBJECTIVE:   Patient rested well overnight. Pain resolved at this time. Ready for OR this AM    ROS:   A 14 point review of systems was conducted, significant findings as noted above. All other systems negative. OBJECTIVE:    PHYSICAL EXAM:    Vitals:    01/13/23 2330 01/14/23 0105 01/14/23 0406 01/14/23 0737   BP: (!) 81/45 106/66 121/76 109/64   Pulse: (!) 123 (!) 107 97 90   Resp: 18 18 18 17   Temp: 99.7 °F (37.6 °C) 98.6 °F (37 °C) 98.6 °F (37 °C) 100.2 °F (37.9 °C)   TempSrc: Oral Oral Oral Oral   SpO2: 96% 96% 97% 94%   Weight:   240 lb 8.4 oz (109.1 kg)    Height:           General appearance: alert, no acute distress  Eyes: No scleral icterus, EOM grossly intact  Neck: trachea midline, no JVD, neck supple  Chest/Lungs: normal effort with no accessory muscle use, no signs of respiratory distress, on RA  Cardiovascular: RRR   Abdomen: soft, non distended, no guarding or rigidity, non tender in RUQ  Skin: warm and dry, no rashes  Extremities: no edema, no cyanosis  Genitourinary: Grossly normal  Neuro: A&Ox3, no focal deficits, sensation intact    LABS:   Recent Labs     01/13/23  1507 01/14/23  0606   WBC 12.4* 17.0*   HGB 13.8 11.5*   HCT 42.2 34.7*   MCV 86.7 83.8    207        Recent Labs     01/13/23  1932 01/14/23  0606    135*   K 3.8 3.5   CL 96* 95*   CO2 23 29   PHOS 3.1 6.3*   BUN 18 20   CREATININE 0.7 0.9        Recent Labs     01/13/23  1507 01/13/23  1932 01/14/23  0606   *  --  852*   *  --  762*   BILIDIR  --  1.8* 2.5*   BILITOT 1.4* 2.8* 3.7*   ALKPHOS 109  --  151*        Recent Labs     01/13/23  1507   LIPASE 76.0*        Recent Labs     01/13/23  1507 01/14/23  0606   PROT 7.8 5.9*   INR  --  1.35*      No results for input(s): CKTOTAL, CKMB, CKMBINDEX, TROPONINI in the last 72 hours.       ASSESSMENT & PLAN:   This is a 59 y.o. female with a diagnosis of acute cholecystitis     - continue Abx  - continue plan OR this AM for robotic/ laparoscopic cholecystectomy with IOC to evaluate for choledocholithiasis  - LA remains increased, will give bolus this AM  - continue NPO  - DVT ppx pre OR     Emilie Stephens DO  PGY1, General Surgery  01/14/23  8:06 AM  [facility]  Pager: [phone]

## 2023-01-14 NOTE — PROGRESS NOTES
Patient admitted to PACU bed 13 from OR s/p CHOLECYSTECTOMY LAPAROSCOPIC. Report received at bedside from Dr. Lenka Nolan. No complications reported. Pt connected to PACU monitoring equipment, IVF infusing, no pain noted. Patient arrived not fully awake from anesthesia, on O2 @ 2L NC breathing easy and unlabored. Surgical incision x 5 covered with surgical glue C/D/I. Will continue to monitor.

## 2023-01-14 NOTE — ANESTHESIA POSTPROCEDURE EVALUATION
Department of Anesthesiology  Postprocedure Note    Patient: Reyes Ras  MRN: [id number]  YOB: 1965  Date of evaluation: 1/14/2023      Procedure Summary     Date: 01/14/23 Room / Location: [address] [place] [address]    Anesthesia Start: 6267 Anesthesia Stop: 1927    Procedure: CHOLECYSTECTOMY LAPAROSCOPIC WITH INTRA-OPERATIVE CHOLANGIOGRAM, TRANSCYSTIC CATHETER BILE DUCT EXPLORATION (Abdomen) Diagnosis:       Acute cholecystitis      (ACUTE CHOLECYSTITIS)    Surgeons: Marielena Escamilla MD Responsible Provider: Jam Moon MD    Anesthesia Type: general ASA Status: 3          Anesthesia Type: No value filed.     Chelsie Phase I:      Chelsie Phase II:        Anesthesia Post Evaluation    Patient location during evaluation: PACU  Patient participation: complete - patient participated  Level of consciousness: awake and alert  Airway patency: patent  Nausea & Vomiting: no nausea and no vomiting  Complications: no  Cardiovascular status: hemodynamically stable  Respiratory status: acceptable  Hydration status: euvolemic  Multimodal analgesia pain management approach

## 2023-01-14 NOTE — PROGRESS NOTES
PACU Transfer Note    Vitals:    01/14/23 1715   BP: (!) 112/57   Pulse: 99   Resp: 20   Temp: 99   SpO2: 94%       In: 100 [P.O.:100]  Out: 700 [Urine:700]    Pain assessment:  present - adequately treated  Pain Level: 5    Report given to receiving unit RN. Provided medications given. Answered all questions.      1/14/2023 5:28 PM

## 2023-01-14 NOTE — PROGRESS NOTES
Patient admitted to 3308 from ED. A&Ox4. No c/o pain or SOB. C/o occasional nausea, none now. RA, sat 97%. Lungs clear. Up with SBA to bathroom. IVF started per right AC PIV as ordered. Febrile with elevated MEWS score. To medicate with tylenol and recheck vitals. NPO except for sips of water. Skin intact. No needs at this time. Patient resting comfortably in bed. Call light in reach. Will continue to monitor.    Electronically signed by Aaron Kathleen RN on 1/13/2023 at 10:57 PM

## 2023-01-14 NOTE — CONSULTS
Consult Note      Cowleyrajiv Tripathi  1965    Consultant: Chrystal Langley  Reason for Consult:  Elevated liver enzymes  Requesting Physician:  Negar Rushing    CHIEF COMPLAINT:  abdominal pain    History Obtained From:  patient, electronic medical record    HISTORY OF PRESENT ILLNESS:                The patient is a 59 y.o. female with significant past medical history of CHF who presents with abdominal pain. 10/10 that began yesterday. Some nausea. Found to have acute rosaura. Noted to have elevated liver enzymes. No new meds. No OTC meds. Denies h/o liver disease.      Past Medical History:        Diagnosis Date    Cardiomyopathy ([facility]     CHF (congestive heart failure) ([facility])     DM (diabetes mellitus) ([facility]     GERD (gastroesophageal reflux disease)     Hypertension      Past Surgical History:        Procedure Laterality Date     SECTION       Medications at Home:  Medications Prior to Admission: ondansetron (ZOFRAN ODT) 4 MG disintegrating tablet, Take 1 tablet by mouth every 8 hours as needed for Nausea or Vomiting  atorvastatin (LIPITOR) 40 MG tablet, Take 1 tablet by mouth daily  metFORMIN (GLUCOPHAGE) 500 MG tablet, Take 2 tablets by mouth 2 times daily  losartan (COZAAR) 100 MG tablet, Take 1 tablet by mouth daily  Semaglutide,0.25 or 0.5MG/DOS, (OZEMPIC, 0.25 OR 0.5 MG/DOSE,) 2 MG/1.5ML SOPN, Inject 0.5 mg into the skin once a week  carvedilol (COREG) 12.5 MG tablet, Take 12.5 mg by mouth 2 times daily (with meals)  potassium chloride 10 MEQ/100ML, Infuse 10 mEq intravenously once (Patient not taking: Reported on 2023)  Mag Aspart-Potassium Aspart (POTASSIUM & MAGNESIUM ASPARTAT) 250-250 MG CAPS, Take by mouth (Patient not taking: Reported on 2023)  HYDROcodone-acetaminophen (NORCO) 5-325 MG per tablet, Take 1 tablet by mouth every 8 hours as needed for Pain (Patient not taking: Reported on 2023)  naproxen (NAPROSYN) 500 MG tablet, Take 1 tablet by mouth 2 times daily (Patient not taking: Reported on 1/13/2023)  aspirin 81 MG EC tablet, Take 1 tablet by mouth daily. (Patient not taking: Reported on 1/13/2023)  esomeprazole (NEXIUM) 40 MG capsule, Take 40 mg by mouth every morning (before breakfast). (Patient not taking: Reported on 1/13/2023)  prenatal vitamin (PRENATAL-S) 27-0.8 MG TABS, Take 1 tablet by mouth daily. (Patient not taking: Reported on 1/13/2023)  Current Medications:    Current Facility-Administered Medications: potassium chloride 10 mEq/100 mL IVPB (Peripheral Line), 10 mEq, IntraVENous, Q1H  atorvastatin (LIPITOR) tablet 40 mg, 40 mg, Oral, Daily  carvedilol (COREG) tablet 12.5 mg, 12.5 mg, Oral, BID WC  sodium chloride flush 0.9 % injection 10 mL, 10 mL, IntraVENous, 2 times per day  sodium chloride flush 0.9 % injection 10 mL, 10 mL, IntraVENous, PRN  0.9 % sodium chloride infusion, , IntraVENous, PRN  sodium chloride flush 0.9 % injection 5-40 mL, 5-40 mL, IntraVENous, 2 times per day  sodium chloride flush 0.9 % injection 5-40 mL, 5-40 mL, IntraVENous, PRN  0.9 % sodium chloride infusion, , IntraVENous, PRN  ondansetron (ZOFRAN-ODT) disintegrating tablet 4 mg, 4 mg, Oral, Q8H PRN **OR** ondansetron (ZOFRAN) injection 4 mg, 4 mg, IntraVENous, Q6H PRN  enoxaparin (LOVENOX) injection 40 mg, 40 mg, SubCUTAneous, Daily  acetaminophen (TYLENOL) tablet 650 mg, 650 mg, Oral, Q4H PRN  oxyCODONE (ROXICODONE) immediate release tablet 5 mg, 5 mg, Oral, Q4H PRN **OR** oxyCODONE (ROXICODONE) immediate release tablet 10 mg, 10 mg, Oral, Q4H PRN  HYDROmorphone (DILAUDID) injection 0.25 mg, 0.25 mg, IntraVENous, Q3H PRN **OR** HYDROmorphone (DILAUDID) injection 0.5 mg, 0.5 mg, IntraVENous, Q3H PRN  lactated ringers infusion, , IntraVENous, Continuous  piperacillin-tazobactam (ZOSYN) 3,375 mg in dextrose 5 % 50 mL IVPB extended infusion (mini-bag), 3,375 mg, IntraVENous, Q8H  Allergies:  Patient has no known allergies. Social History:    TOBACCO:   reports that she has never smoked.  She has never used smokeless tobacco.  ETOH:   reports no history of alcohol use. DRUGS:   reports no history of drug use. Family History:   No family history on file. REVIEW OF SYSTEMS:      A comprehensive 12 point review of systems is negative except as documented above. PHYSICAL EXAM:      Vitals:    /64   Pulse 90   Temp 99.1 °F (37.3 °C)   Resp 17   Ht 5' 6\" (1.676 m)   Wt 240 lb 8.4 oz (109.1 kg)   LMP 02/12/2016   SpO2 94%   BMI 38.82 kg/m²     General: No acute distress  HEENT: PERRL, EOMI, oral mucosa moist and intact, no sclericterus  Neck: no thyromegaly  Lymphatic: no cervical or supraclavicular lymphadenopathy  Heart: no m/r/g; +s1/s2 rrr  Lungs: CTA bilaterally  Abdomen: soft, nt, nd +BS  Extremities: 2+pulses, no edema  Skin: no rashes or lesions  Neuro: a&o x 3; no gross deficit  DATA:    Recent Labs     01/13/23  1507 01/14/23  0606   WBC 12.4* 17.0*   HGB 13.8 11.5*   HCT 42.2 34.7*   MCV 86.7 83.8    207     Recent Labs     01/13/23  1507 01/13/23  1628 01/13/23 1932 01/14/23  0606   *  --  136 135*   K 9.6* 4.8 3.8 3.5   CL 95*  --  96* 95*   CO2 23  --  23 29   PHOS  --   --  3.1 6.3*   BUN 15  --  18 20   CREATININE 0.7  --  0.7 0.9     Recent Labs     01/13/23  1507 01/13/23  1932 01/14/23  0606   *  --  852*   *  --  762*   BILIDIR  --  1.8* 2.5*   BILITOT 1.4* 2.8* 3.7*   ALKPHOS 109  --  151*     Recent Labs     01/13/23  1507   LIPASE 76.0*     Recent Labs     01/13/23  1507 01/14/23  0606   PROT 7.8 5.9*   INR  --  1.35*     No results for input(s): PTT in the last 72 hours. No results for input(s): OCCULTBLD in the last 72 hours. Imaging: CT    IMPRESSION/RECOMMENDATIONS:      Cholecystitis  Elevated liver enzymes    Check acute hep panel  Liver enzymes normal in the past and no stigmata of chronic liver disease.  This is an acute event; consider stone vs ischemia from hypotension related to cholecystitis vs drug reaction from zosyn      Thank you for allowing me to participate in Xenia Davis's care. James Daniel.  Karen Healy MD

## 2023-01-14 NOTE — PLAN OF CARE
Pt free from falls this shift. Fall precautions in place at all times. Call light always withinreach. Pt able and agreeable to contact for safety appropriately. Bed alarm on. Pain/discomfort being managed with PRN analgesics per MD orders. Pt able to express presence and absence of pain and rate pain appropriately using numerical scale. No

## 2023-01-15 LAB
ALBUMIN SERPL-MCNC: 3.2 G/DL (ref 3.4–5)
ALP BLD-CCNC: 172 U/L (ref 40–129)
ALT SERPL-CCNC: 446 U/L (ref 10–40)
ANION GAP SERPL CALCULATED.3IONS-SCNC: 8 MMOL/L (ref 3–16)
AST SERPL-CCNC: 249 U/L (ref 15–37)
BASOPHILS ABSOLUTE: 0 K/UL (ref 0–0.2)
BASOPHILS RELATIVE PERCENT: 0.1 %
BILIRUB SERPL-MCNC: 5.4 MG/DL (ref 0–1)
BILIRUBIN DIRECT: 3.9 MG/DL (ref 0–0.3)
BILIRUBIN, INDIRECT: 1.5 MG/DL (ref 0–1)
BUN BLDV-MCNC: 10 MG/DL (ref 7–20)
CALCIUM SERPL-MCNC: 8.8 MG/DL (ref 8.3–10.6)
CHLORIDE BLD-SCNC: 102 MMOL/L (ref 99–110)
CO2: 28 MMOL/L (ref 21–32)
CREAT SERPL-MCNC: <0.5 MG/DL (ref 0.6–1.1)
EKG ATRIAL RATE: 94 BPM
EKG DIAGNOSIS: NORMAL
EKG P AXIS: 37 DEGREES
EKG P-R INTERVAL: 158 MS
EKG Q-T INTERVAL: 364 MS
EKG QRS DURATION: 100 MS
EKG QTC CALCULATION (BAZETT): 455 MS
EKG R AXIS: -8 DEGREES
EKG T AXIS: 37 DEGREES
EKG VENTRICULAR RATE: 94 BPM
EOSINOPHILS ABSOLUTE: 0.1 K/UL (ref 0–0.6)
EOSINOPHILS RELATIVE PERCENT: 1.1 %
GFR SERPL CREATININE-BSD FRML MDRD: >60 ML/MIN/{1.73_M2}
GLUCOSE BLD-MCNC: 192 MG/DL (ref 70–99)
HAV IGM SER IA-ACNC: NORMAL
HCT VFR BLD CALC: 33 % (ref 36–48)
HEMOGLOBIN: 10.9 G/DL (ref 12–16)
HEPATITIS B CORE IGM ANTIBODY: NORMAL
HEPATITIS B SURFACE ANTIGEN INTERPRETATION: NORMAL
HEPATITIS C ANTIBODY INTERPRETATION: NORMAL
LYMPHOCYTES ABSOLUTE: 0.6 K/UL (ref 1–5.1)
LYMPHOCYTES RELATIVE PERCENT: 6.2 %
MAGNESIUM: 2.1 MG/DL (ref 1.8–2.4)
MCH RBC QN AUTO: 28.4 PG (ref 26–34)
MCHC RBC AUTO-ENTMCNC: 32.9 G/DL (ref 31–36)
MCV RBC AUTO: 86.1 FL (ref 80–100)
MONOCYTES ABSOLUTE: 0.5 K/UL (ref 0–1.3)
MONOCYTES RELATIVE PERCENT: 5.4 %
NEUTROPHILS ABSOLUTE: 8.1 K/UL (ref 1.7–7.7)
NEUTROPHILS RELATIVE PERCENT: 87.2 %
PDW BLD-RTO: 15.4 % (ref 12.4–15.4)
PHOSPHORUS: 2.7 MG/DL (ref 2.5–4.9)
PLATELET # BLD: 167 K/UL (ref 135–450)
PMV BLD AUTO: 9 FL (ref 5–10.5)
POTASSIUM SERPL-SCNC: 4.1 MMOL/L (ref 3.5–5.1)
RBC # BLD: 3.84 M/UL (ref 4–5.2)
REPORT: NORMAL
SODIUM BLD-SCNC: 138 MMOL/L (ref 136–145)
TOTAL PROTEIN: 5.6 G/DL (ref 6.4–8.2)
WBC # BLD: 9.3 K/UL (ref 4–11)

## 2023-01-15 PROCEDURE — 94761 N-INVAS EAR/PLS OXIMETRY MLT: CPT

## 2023-01-15 PROCEDURE — 2500000003 HC RX 250 WO HCPCS

## 2023-01-15 PROCEDURE — 6360000002 HC RX W HCPCS: Performed by: STUDENT IN AN ORGANIZED HEALTH CARE EDUCATION/TRAINING PROGRAM

## 2023-01-15 PROCEDURE — 2580000003 HC RX 258: Performed by: STUDENT IN AN ORGANIZED HEALTH CARE EDUCATION/TRAINING PROGRAM

## 2023-01-15 PROCEDURE — 2700000000 HC OXYGEN THERAPY PER DAY

## 2023-01-15 PROCEDURE — 36592 COLLECT BLOOD FROM PICC: CPT

## 2023-01-15 PROCEDURE — 1200000000 HC SEMI PRIVATE

## 2023-01-15 PROCEDURE — 6370000000 HC RX 637 (ALT 250 FOR IP): Performed by: STUDENT IN AN ORGANIZED HEALTH CARE EDUCATION/TRAINING PROGRAM

## 2023-01-15 PROCEDURE — 36415 COLL VENOUS BLD VENIPUNCTURE: CPT

## 2023-01-15 PROCEDURE — 83735 ASSAY OF MAGNESIUM: CPT

## 2023-01-15 PROCEDURE — 80069 RENAL FUNCTION PANEL: CPT

## 2023-01-15 PROCEDURE — 80076 HEPATIC FUNCTION PANEL: CPT

## 2023-01-15 PROCEDURE — 93010 ELECTROCARDIOGRAM REPORT: CPT | Performed by: INTERNAL MEDICINE

## 2023-01-15 PROCEDURE — 85025 COMPLETE CBC W/AUTO DIFF WBC: CPT

## 2023-01-15 PROCEDURE — 2580000003 HC RX 258

## 2023-01-15 RX ORDER — ENOXAPARIN SODIUM 100 MG/ML
30 INJECTION SUBCUTANEOUS 2 TIMES DAILY
Status: COMPLETED | OUTPATIENT
Start: 2023-01-15 | End: 2023-01-15

## 2023-01-15 RX ADMIN — ONDANSETRON 4 MG: 4 TABLET, ORALLY DISINTEGRATING ORAL at 15:54

## 2023-01-15 RX ADMIN — PIPERACILLIN SODIUM AND TAZOBACTAM SODIUM 3375 MG: 3; .375 INJECTION, POWDER, LYOPHILIZED, FOR SOLUTION INTRAVENOUS at 19:01

## 2023-01-15 RX ADMIN — PIPERACILLIN SODIUM AND TAZOBACTAM SODIUM 3375 MG: 3; .375 INJECTION, POWDER, LYOPHILIZED, FOR SOLUTION INTRAVENOUS at 03:05

## 2023-01-15 RX ADMIN — ATORVASTATIN CALCIUM 40 MG: 40 TABLET, FILM COATED ORAL at 08:32

## 2023-01-15 RX ADMIN — CARVEDILOL 12.5 MG: 12.5 TABLET, FILM COATED ORAL at 15:54

## 2023-01-15 RX ADMIN — ENOXAPARIN SODIUM 30 MG: 100 INJECTION SUBCUTANEOUS at 11:08

## 2023-01-15 RX ADMIN — CARVEDILOL 12.5 MG: 12.5 TABLET, FILM COATED ORAL at 08:32

## 2023-01-15 RX ADMIN — OXYCODONE HYDROCHLORIDE 10 MG: 5 TABLET ORAL at 02:04

## 2023-01-15 RX ADMIN — OXYCODONE HYDROCHLORIDE 10 MG: 5 TABLET ORAL at 20:36

## 2023-01-15 RX ADMIN — ENOXAPARIN SODIUM 30 MG: 100 INJECTION SUBCUTANEOUS at 20:37

## 2023-01-15 RX ADMIN — OXYCODONE HYDROCHLORIDE 10 MG: 5 TABLET ORAL at 15:54

## 2023-01-15 RX ADMIN — PIPERACILLIN SODIUM AND TAZOBACTAM SODIUM 3375 MG: 3; .375 INJECTION, POWDER, LYOPHILIZED, FOR SOLUTION INTRAVENOUS at 11:09

## 2023-01-15 RX ADMIN — SODIUM PHOSPHATE, MONOBASIC, MONOHYDRATE AND SODIUM PHOSPHATE, DIBASIC, ANHYDROUS 10 MMOL: 276; 142 INJECTION, SOLUTION INTRAVENOUS at 11:09

## 2023-01-15 RX ADMIN — OXYCODONE HYDROCHLORIDE 10 MG: 5 TABLET ORAL at 08:32

## 2023-01-15 ASSESSMENT — PAIN DESCRIPTION - PAIN TYPE
TYPE: SURGICAL PAIN
TYPE: SURGICAL PAIN
TYPE: ACUTE PAIN;SURGICAL PAIN

## 2023-01-15 ASSESSMENT — PAIN DESCRIPTION - LOCATION
LOCATION: ABDOMEN

## 2023-01-15 ASSESSMENT — PAIN SCALES - GENERAL
PAINLEVEL_OUTOF10: 7
PAINLEVEL_OUTOF10: 7
PAINLEVEL_OUTOF10: 0
PAINLEVEL_OUTOF10: 0
PAINLEVEL_OUTOF10: 1
PAINLEVEL_OUTOF10: 7
PAINLEVEL_OUTOF10: 8
PAINLEVEL_OUTOF10: 3
PAINLEVEL_OUTOF10: 1

## 2023-01-15 ASSESSMENT — PAIN - FUNCTIONAL ASSESSMENT: PAIN_FUNCTIONAL_ASSESSMENT: PREVENTS OR INTERFERES SOME ACTIVE ACTIVITIES AND ADLS

## 2023-01-15 ASSESSMENT — PAIN DESCRIPTION - ORIENTATION: ORIENTATION: MID

## 2023-01-15 ASSESSMENT — PAIN DESCRIPTION - FREQUENCY: FREQUENCY: INTERMITTENT

## 2023-01-15 ASSESSMENT — PAIN DESCRIPTION - DESCRIPTORS: DESCRIPTORS: SHARP;SORE

## 2023-01-15 ASSESSMENT — PAIN DESCRIPTION - ONSET: ONSET: GRADUAL

## 2023-01-15 NOTE — PROGRESS NOTES
General Surgery   Daily Progress Note  Patient: Becky Rae      CC: acute cholecystitis    SUBJECTIVE:   Patient rested well overnight. Pain is controlled. Tolerating sips without N/V. Voiding appropriately without issues. Has not had a fever since 01/14 1157. She is feeling fine this AM.     ROS:   A 14 point review of systems was conducted, significant findings as noted above. All other systems negative. OBJECTIVE:    PHYSICAL EXAM:    Vitals:    01/15/23 0204 01/15/23 0306 01/15/23 0309 01/15/23 0553   BP:  117/67     Pulse:  99     Resp: 19 18     Temp:  99.4 °F (37.4 °C)     TempSrc:  Oral     SpO2:  (!) 89% 92%    Weight:    239 lb (108.4 kg)   Height:         General appearance: Alert, no acute distress, grooming appropriate  Chest/Lungs: Symmetrical chest ride, normal effort  Cardiovascular: RRR  Abdomen: Soft, appropriately tender, non-distended, incisions c/d/i  Extremities: No edema, no cyanosis  Neuro: A&Ox3, no focal deficits, sensation intact    LABS:   Recent Labs     01/13/23  1507 01/14/23  0606   WBC 12.4* 17.0*   HGB 13.8 11.5*   HCT 42.2 34.7*   MCV 86.7 83.8    207        Recent Labs     01/13/23  1932 01/14/23  0606    135*   K 3.8 3.5   CL 96* 95*   CO2 23 29   PHOS 3.1 6.3*   BUN 18 20   CREATININE 0.7 0.9        Recent Labs     01/14/23  0606 01/14/23  1055   * 551*   * 627*   BILIDIR 2.5* 3.6*   BILITOT 3.7* 4.6*   ALKPHOS 151* 147*        Recent Labs     01/13/23  1507   LIPASE 76.0*        Recent Labs     01/14/23  0606 01/14/23  1055   PROT 5.9* 5.4*   INR 1.35*  --       No results for input(s): CKTOTAL, CKMB, CKMBINDEX, TROPONINI in the last 72 hours.       ASSESSMENT & PLAN:   This is a 59y.o. year old female with a diagnosis of acute cholecystitis s/p lap rosaura with IOC and possible retained CBD stone 01/15; POD #1.    - Continue NPO, IVF  - Will discuss possible ERCP with GI for concerns of retained stone and increasing bilirubin  - Encourage IS, deep breathing  - Encourage OOB, ambulation      Lauren Conrad DO, Ozzie  PGY1, General Surgery  01/15/23  7:51 AM  [employer]  Pager: [phone]

## 2023-01-15 NOTE — PROGRESS NOTES
Department of Surgery:  Post-op Note    CC: Acute cholecystitis    Subjective:   Pain is controlled, denies nausea or vomiting. Voiding. Denies BM or flatus. Tolerating CLD.      Objective:  Anesthesia type: General    Physical Exam:  Vitals:    01/14/23 1645 01/14/23 1700 01/14/23 1715 01/14/23 1755   BP: (!) 97/58 117/69 (!) 112/57 124/74   Pulse: 95 97 99 99   Resp: 15 14 20 19   Temp:   99 °F (37.2 °C) 99.9 °F (37.7 °C)   TempSrc:   Temporal Oral   SpO2: 95% 96% 94% 94%   Weight:       Height:           General appearance: alert, no acute distress, grooming appropriate  Chest/Lungs: symmetrical chest ride, normal effort  Cardiovascular: RRR  Abdomen: soft, appropriately tender, non-distended, incisions c/d/i  Extremities: no edema, no cyanosis  Neuro: A&Ox3, no focal deficits, sensation intact    Assessment and Plan  This is a 59y.o. year old female with a diagnosis of acute cholecystitis s/p lap rosaura with IOC and possible retained CBD stone POD #0    Pain management: oxycodone and dilaudid  Cardiovascular: RRR  Respiratory: extubated, encourage hourly incentive spirometry and deep breathing  FEN:  Fluids: , Diet: CLD, NPO at midnight incase need for ERCP  : Urine output is adequate  Wound: local care  Ambulation: OOB to chair, encourage ambulation  Prophylaxis: SCDs, lovenox (Held for possible ERCP)    Yodit Trevino DO  PGY1, General Surgery  01/14/23  11:02 PM  [employer]  Pager: [phone]

## 2023-01-15 NOTE — PROGRESS NOTES
GI Progress Note      Christopher Jones is a 59 y.o. female patient. 1. Abdominal pain, epigastric    2. Transaminitis    3. Biliary colic    4. Acute cholecystitis        Admit Date: 1/13/2023    Subjective:       No pain.  Had surgery yesterday and did well      ROS:  As per above    Scheduled Meds:   atorvastatin  40 mg Oral Daily    carvedilol  12.5 mg Oral BID WC    sodium chloride flush  10 mL IntraVENous 2 times per day    sodium chloride flush  5-40 mL IntraVENous 2 times per day    piperacillin-tazobactam  3,375 mg IntraVENous Q8H       Continuous Infusions:   sodium chloride      sodium chloride      lactated ringers 1,000 mL/hr at 01/14/23 1615       PRN Meds:  sodium chloride flush, sodium chloride, sodium chloride flush, sodium chloride, ondansetron **OR** ondansetron, oxyCODONE **OR** oxyCODONE, HYDROmorphone **OR** HYDROmorphone      Objective:       Patient Vitals for the past 24 hrs:   BP Temp Temp src Pulse Resp SpO2 Weight   01/15/23 0553 -- -- -- -- -- -- 239 lb (108.4 kg)   01/15/23 0309 -- -- -- -- -- 92 % --   01/15/23 0306 117/67 99.4 °F (37.4 °C) Oral 99 18 (!) 89 % --   01/15/23 0204 -- -- -- -- 19 -- --   01/14/23 2303 116/62 99.4 °F (37.4 °C) Oral 95 16 92 % --   01/14/23 1946 119/73 99.6 °F (37.6 °C) Oral (!) 105 18 96 % --   01/14/23 1755 124/74 99.9 °F (37.7 °C) Oral 99 19 94 % --   01/14/23 1715 (!) 112/57 99 °F (37.2 °C) Temporal 99 20 94 % --   01/14/23 1700 117/69 -- -- 97 14 96 % --   01/14/23 1645 (!) 97/58 -- -- 95 15 95 % --   01/14/23 1627 108/60 -- -- 100 20 92 % --   01/14/23 1625 108/60 -- -- 100 16 93 % --   01/14/23 1620 107/63 -- -- (!) 104 23 92 % --   01/14/23 1617 -- 99 °F (37.2 °C) Temporal (!) 107 20 (!) 89 % --   01/14/23 1337 124/78 98.5 °F (36.9 °C) Temporal 90 19 94 % --   01/14/23 1157 124/73 (!) 100.5 °F (38.1 °C) Oral (!) 107 16 94 % --   01/14/23 0900 -- 99.1 °F (37.3 °C) -- -- -- -- --       Exam:  VITALS:  /67   Pulse 99   Temp 99.4 °F (37.4 °C) (Oral)   Resp 18   Ht 5' 6\" (1.676 m)   Wt 239 lb (108.4 kg)   LMP 2016   SpO2 92%   BMI 38.58 kg/m²   TEMPERATURE:  Current - Temp: 99.4 °F (37.4 °C); Max - Temp  Av.4 °F (37.4 °C)  Min: 98.5 °F (36.9 °C)  Max: 100.5 °F (38.1 °C)      General appearance: alert, appears stated age, cooperative and no distress  Head: Normocephalic, without obvious abnormality, atraumatic  Neck: supple, symmetrical, trachea midline and thyroid not enlarged, symmetric, no tenderness/mass/nodules  CVS:  RRR, Nl s1s2  Lungs CTA Bilaterally, normal effort  Abdomen: soft, wounds dressed +BS  AAOx3, No asterixis or encephalopathy  Extremities: No edema. Recent Labs     23  1507 23  0606   WBC 12.4* 17.0*   HGB 13.8 11.5*   HCT 42.2 34.7*   MCV 86.7 83.8    207     Recent Labs     23  1507 23  1628 23  19323  0606   *  --  136 135*   K 9.6* 4.8 3.8 3.5   CL 95*  --  96* 95*   CO2 23  --  23 29   PHOS  --   --  3.1 6.3*   BUN 15  --  18 20   CREATININE 0.7  --  0.7 0.9     Recent Labs     23  1507 23  19323  0606 23  1055   *  --  852* 551*   *  --  762* 627*   BILIDIR  --  1.8* 2.5* 3.6*   BILITOT 1.4* 2.8* 3.7* 4.6*   ALKPHOS 109  --  151* 147*     Recent Labs     23  1507   LIPASE 76.0*     Recent Labs     23  1507 23  0606 23  1055   PROT 7.8 5.9* 5.4*   INR  --  1.35*  --      No results for input(s): PTT in the last 72 hours. No results for input(s): OCCULTBLD in the last 72 hours. Assessment:       transaminitis    Recommendations:       Await liver tests this morning  Await acute hep panel    Renuka Post MD  1/15/2023  8:11 AM  Addendum:  Liver enzymes improved but bilirubin up a bit. Possible retained stone. Will plan on ERCP tomorrow. No fever or hypotension so not emergently needed today.  Can eat today if ok with surgery

## 2023-01-15 NOTE — PLAN OF CARE
Problem: Discharge Planning  Goal: Discharge to home or other facility with appropriate resources  1/15/2023 0219 by Ana Luisa Valenzuela RN  Outcome: Progressing   Patient to be discharged when medically stable. Problem: Pain  Goal: Verbalizes/displays adequate comfort level or baseline comfort level  1/15/2023 0219 by Ana Luisa Valenzuela RN  Outcome: Progressing   C/o abdominal pain at lap sites, medicated with prn medications. Will monitor and medicate per mar. Problem: Safety - Adult  Goal: Free from fall injury  1/15/2023 0219 by Ana Luisa Valenzuela RN  Outcome: Progressing   Fall precautions in place. Bed alarm activated, low position, wheels locked. Up with stand-by assist.  Call light and belongings within reach. Continue to monitor safety.

## 2023-01-15 NOTE — PROGRESS NOTES
Patient refusing to ambulate stating she is too sore. Ambulating back and forth to bathroom frequently only.

## 2023-01-16 ENCOUNTER — ANESTHESIA EVENT (OUTPATIENT)
Dept: ENDOSCOPY | Age: 58
DRG: 412 | End: 2023-01-16
Payer: COMMERCIAL

## 2023-01-16 ENCOUNTER — ANESTHESIA (OUTPATIENT)
Dept: ENDOSCOPY | Age: 58
DRG: 412 | End: 2023-01-16
Payer: COMMERCIAL

## 2023-01-16 ENCOUNTER — APPOINTMENT (OUTPATIENT)
Dept: GENERAL RADIOLOGY | Age: 58
DRG: 412 | End: 2023-01-16
Payer: COMMERCIAL

## 2023-01-16 LAB
ALBUMIN SERPL-MCNC: 3.2 G/DL (ref 3.4–5)
ALP BLD-CCNC: 315 U/L (ref 40–129)
ALT SERPL-CCNC: 334 U/L (ref 10–40)
ANION GAP SERPL CALCULATED.3IONS-SCNC: 8 MMOL/L (ref 3–16)
AST SERPL-CCNC: 168 U/L (ref 15–37)
BASOPHILS ABSOLUTE: 0 K/UL (ref 0–0.2)
BASOPHILS RELATIVE PERCENT: 0.5 %
BILIRUB SERPL-MCNC: 4 MG/DL (ref 0–1)
BILIRUBIN DIRECT: 3.3 MG/DL (ref 0–0.3)
BILIRUBIN, INDIRECT: 0.7 MG/DL (ref 0–1)
BUN BLDV-MCNC: 9 MG/DL (ref 7–20)
CALCIUM SERPL-MCNC: 8.9 MG/DL (ref 8.3–10.6)
CHLORIDE BLD-SCNC: 98 MMOL/L (ref 99–110)
CO2: 30 MMOL/L (ref 21–32)
CREAT SERPL-MCNC: 0.7 MG/DL (ref 0.6–1.1)
EOSINOPHILS ABSOLUTE: 0.2 K/UL (ref 0–0.6)
EOSINOPHILS RELATIVE PERCENT: 3.1 %
GFR SERPL CREATININE-BSD FRML MDRD: >60 ML/MIN/{1.73_M2}
GLUCOSE BLD-MCNC: 168 MG/DL (ref 70–99)
GLUCOSE BLD-MCNC: 182 MG/DL (ref 70–99)
HCT VFR BLD CALC: 33.2 % (ref 36–48)
HEMOGLOBIN: 11 G/DL (ref 12–16)
LYMPHOCYTES ABSOLUTE: 0.9 K/UL (ref 1–5.1)
LYMPHOCYTES RELATIVE PERCENT: 13.9 %
MAGNESIUM: 1.7 MG/DL (ref 1.8–2.4)
MCH RBC QN AUTO: 27.5 PG (ref 26–34)
MCHC RBC AUTO-ENTMCNC: 33.2 G/DL (ref 31–36)
MCV RBC AUTO: 82.8 FL (ref 80–100)
MONOCYTES ABSOLUTE: 0.4 K/UL (ref 0–1.3)
MONOCYTES RELATIVE PERCENT: 6.1 %
NEUTROPHILS ABSOLUTE: 4.7 K/UL (ref 1.7–7.7)
NEUTROPHILS RELATIVE PERCENT: 76.4 %
PDW BLD-RTO: 14.7 % (ref 12.4–15.4)
PERFORMED ON: ABNORMAL
PHOSPHORUS: 2 MG/DL (ref 2.5–4.9)
PLATELET # BLD: 180 K/UL (ref 135–450)
PMV BLD AUTO: 8.9 FL (ref 5–10.5)
POTASSIUM SERPL-SCNC: 3.4 MMOL/L (ref 3.5–5.1)
RBC # BLD: 4.01 M/UL (ref 4–5.2)
SODIUM BLD-SCNC: 136 MMOL/L (ref 136–145)
TOTAL PROTEIN: 6.2 G/DL (ref 6.4–8.2)
WBC # BLD: 6.2 K/UL (ref 4–11)

## 2023-01-16 PROCEDURE — 3609018800 HC ERCP DX COLLECTION SPECIMEN BRUSHING/WASHING: Performed by: INTERNAL MEDICINE

## 2023-01-16 PROCEDURE — 3700000001 HC ADD 15 MINUTES (ANESTHESIA): Performed by: INTERNAL MEDICINE

## 2023-01-16 PROCEDURE — 0F798DZ DILATION OF COMMON BILE DUCT WITH INTRALUMINAL DEVICE, VIA NATURAL OR ARTIFICIAL OPENING ENDOSCOPIC: ICD-10-PCS | Performed by: INTERNAL MEDICINE

## 2023-01-16 PROCEDURE — BF111ZZ FLUOROSCOPY OF BILIARY AND PANCREATIC DUCTS USING LOW OSMOLAR CONTRAST: ICD-10-PCS | Performed by: INTERNAL MEDICINE

## 2023-01-16 PROCEDURE — 6360000002 HC RX W HCPCS: Performed by: STUDENT IN AN ORGANIZED HEALTH CARE EDUCATION/TRAINING PROGRAM

## 2023-01-16 PROCEDURE — 80069 RENAL FUNCTION PANEL: CPT

## 2023-01-16 PROCEDURE — 2580000003 HC RX 258: Performed by: STUDENT IN AN ORGANIZED HEALTH CARE EDUCATION/TRAINING PROGRAM

## 2023-01-16 PROCEDURE — 2500000003 HC RX 250 WO HCPCS: Performed by: STUDENT IN AN ORGANIZED HEALTH CARE EDUCATION/TRAINING PROGRAM

## 2023-01-16 PROCEDURE — 83735 ASSAY OF MAGNESIUM: CPT

## 2023-01-16 PROCEDURE — 3700000000 HC ANESTHESIA ATTENDED CARE: Performed by: INTERNAL MEDICINE

## 2023-01-16 PROCEDURE — 6370000000 HC RX 637 (ALT 250 FOR IP): Performed by: STUDENT IN AN ORGANIZED HEALTH CARE EDUCATION/TRAINING PROGRAM

## 2023-01-16 PROCEDURE — 0FC98ZZ EXTIRPATION OF MATTER FROM COMMON BILE DUCT, VIA NATURAL OR ARTIFICIAL OPENING ENDOSCOPIC: ICD-10-PCS | Performed by: INTERNAL MEDICINE

## 2023-01-16 PROCEDURE — 7100000000 HC PACU RECOVERY - FIRST 15 MIN: Performed by: INTERNAL MEDICINE

## 2023-01-16 PROCEDURE — 2500000003 HC RX 250 WO HCPCS: Performed by: NURSE ANESTHETIST, CERTIFIED REGISTERED

## 2023-01-16 PROCEDURE — C2625 STENT, NON-COR, TEM W/DEL SY: HCPCS | Performed by: INTERNAL MEDICINE

## 2023-01-16 PROCEDURE — 1200000000 HC SEMI PRIVATE

## 2023-01-16 PROCEDURE — 85025 COMPLETE CBC W/AUTO DIFF WBC: CPT

## 2023-01-16 PROCEDURE — 36415 COLL VENOUS BLD VENIPUNCTURE: CPT

## 2023-01-16 PROCEDURE — C1769 GUIDE WIRE: HCPCS | Performed by: INTERNAL MEDICINE

## 2023-01-16 PROCEDURE — 6360000002 HC RX W HCPCS: Performed by: NURSE ANESTHETIST, CERTIFIED REGISTERED

## 2023-01-16 PROCEDURE — 6370000000 HC RX 637 (ALT 250 FOR IP): Performed by: INTERNAL MEDICINE

## 2023-01-16 PROCEDURE — 2720000010 HC SURG SUPPLY STERILE: Performed by: INTERNAL MEDICINE

## 2023-01-16 PROCEDURE — 2709999900 HC NON-CHARGEABLE SUPPLY: Performed by: INTERNAL MEDICINE

## 2023-01-16 PROCEDURE — 74330 X-RAY BILE/PANC ENDOSCOPY: CPT

## 2023-01-16 PROCEDURE — 7100000001 HC PACU RECOVERY - ADDTL 15 MIN: Performed by: INTERNAL MEDICINE

## 2023-01-16 PROCEDURE — 80076 HEPATIC FUNCTION PANEL: CPT

## 2023-01-16 PROCEDURE — 2580000003 HC RX 258: Performed by: NURSE ANESTHETIST, CERTIFIED REGISTERED

## 2023-01-16 DEVICE — ZIMMON, BILIARY STENT SET
Type: IMPLANTABLE DEVICE | Status: FUNCTIONAL
Brand: ZIMMON

## 2023-01-16 RX ORDER — SODIUM CHLORIDE 9 MG/ML
INJECTION, SOLUTION INTRAVENOUS CONTINUOUS PRN
Status: DISCONTINUED | OUTPATIENT
Start: 2023-01-16 | End: 2023-01-16 | Stop reason: SDUPTHER

## 2023-01-16 RX ORDER — DIPHENHYDRAMINE HYDROCHLORIDE 50 MG/ML
12.5 INJECTION INTRAMUSCULAR; INTRAVENOUS
Status: DISCONTINUED | OUTPATIENT
Start: 2023-01-16 | End: 2023-01-16 | Stop reason: HOSPADM

## 2023-01-16 RX ORDER — MAGNESIUM SULFATE IN WATER 40 MG/ML
2000 INJECTION, SOLUTION INTRAVENOUS ONCE
Status: COMPLETED | OUTPATIENT
Start: 2023-01-16 | End: 2023-01-16

## 2023-01-16 RX ORDER — METOCLOPRAMIDE HYDROCHLORIDE 5 MG/ML
10 INJECTION INTRAMUSCULAR; INTRAVENOUS
Status: DISCONTINUED | OUTPATIENT
Start: 2023-01-16 | End: 2023-01-16 | Stop reason: HOSPADM

## 2023-01-16 RX ORDER — ROCURONIUM BROMIDE 10 MG/ML
INJECTION, SOLUTION INTRAVENOUS PRN
Status: DISCONTINUED | OUTPATIENT
Start: 2023-01-16 | End: 2023-01-16 | Stop reason: SDUPTHER

## 2023-01-16 RX ORDER — MEPERIDINE HYDROCHLORIDE 25 MG/ML
12.5 INJECTION INTRAMUSCULAR; INTRAVENOUS; SUBCUTANEOUS EVERY 5 MIN PRN
Status: DISCONTINUED | OUTPATIENT
Start: 2023-01-16 | End: 2023-01-16 | Stop reason: HOSPADM

## 2023-01-16 RX ORDER — SODIUM CHLORIDE 0.9 % (FLUSH) 0.9 %
5-40 SYRINGE (ML) INJECTION EVERY 12 HOURS SCHEDULED
Status: DISCONTINUED | OUTPATIENT
Start: 2023-01-16 | End: 2023-01-16 | Stop reason: HOSPADM

## 2023-01-16 RX ORDER — SODIUM CHLORIDE 0.9 % (FLUSH) 0.9 %
5-40 SYRINGE (ML) INJECTION PRN
Status: DISCONTINUED | OUTPATIENT
Start: 2023-01-16 | End: 2023-01-16 | Stop reason: HOSPADM

## 2023-01-16 RX ORDER — PROPOFOL 10 MG/ML
INJECTION, EMULSION INTRAVENOUS PRN
Status: DISCONTINUED | OUTPATIENT
Start: 2023-01-16 | End: 2023-01-16 | Stop reason: SDUPTHER

## 2023-01-16 RX ORDER — LIDOCAINE HYDROCHLORIDE 20 MG/ML
INJECTION, SOLUTION INTRAVENOUS PRN
Status: DISCONTINUED | OUTPATIENT
Start: 2023-01-16 | End: 2023-01-16 | Stop reason: SDUPTHER

## 2023-01-16 RX ORDER — HYDRALAZINE HYDROCHLORIDE 20 MG/ML
10 INJECTION INTRAMUSCULAR; INTRAVENOUS
Status: DISCONTINUED | OUTPATIENT
Start: 2023-01-16 | End: 2023-01-16 | Stop reason: HOSPADM

## 2023-01-16 RX ORDER — FENTANYL CITRATE 50 UG/ML
INJECTION, SOLUTION INTRAMUSCULAR; INTRAVENOUS PRN
Status: DISCONTINUED | OUTPATIENT
Start: 2023-01-16 | End: 2023-01-16 | Stop reason: SDUPTHER

## 2023-01-16 RX ORDER — LABETALOL HYDROCHLORIDE 5 MG/ML
10 INJECTION, SOLUTION INTRAVENOUS
Status: DISCONTINUED | OUTPATIENT
Start: 2023-01-16 | End: 2023-01-16 | Stop reason: HOSPADM

## 2023-01-16 RX ORDER — ENOXAPARIN SODIUM 100 MG/ML
30 INJECTION SUBCUTANEOUS 2 TIMES DAILY
Status: DISCONTINUED | OUTPATIENT
Start: 2023-01-16 | End: 2023-01-17 | Stop reason: HOSPADM

## 2023-01-16 RX ORDER — SUCCINYLCHOLINE CHLORIDE 20 MG/ML
INJECTION INTRAMUSCULAR; INTRAVENOUS PRN
Status: DISCONTINUED | OUTPATIENT
Start: 2023-01-16 | End: 2023-01-16 | Stop reason: SDUPTHER

## 2023-01-16 RX ORDER — SODIUM CHLORIDE 9 MG/ML
25 INJECTION, SOLUTION INTRAVENOUS PRN
Status: DISCONTINUED | OUTPATIENT
Start: 2023-01-16 | End: 2023-01-16 | Stop reason: HOSPADM

## 2023-01-16 RX ORDER — ONDANSETRON 2 MG/ML
INJECTION INTRAMUSCULAR; INTRAVENOUS PRN
Status: DISCONTINUED | OUTPATIENT
Start: 2023-01-16 | End: 2023-01-16 | Stop reason: SDUPTHER

## 2023-01-16 RX ADMIN — MAGNESIUM SULFATE HEPTAHYDRATE 2000 MG: 40 INJECTION, SOLUTION INTRAVENOUS at 11:46

## 2023-01-16 RX ADMIN — SODIUM CHLORIDE, POTASSIUM CHLORIDE, SODIUM LACTATE AND CALCIUM CHLORIDE: 600; 310; 30; 20 INJECTION, SOLUTION INTRAVENOUS at 20:22

## 2023-01-16 RX ADMIN — PROPOFOL 200 MG: 10 INJECTION, EMULSION INTRAVENOUS at 16:27

## 2023-01-16 RX ADMIN — SODIUM CHLORIDE: 9 INJECTION, SOLUTION INTRAVENOUS at 16:26

## 2023-01-16 RX ADMIN — SODIUM CHLORIDE: 9 INJECTION, SOLUTION INTRAVENOUS at 23:55

## 2023-01-16 RX ADMIN — PIPERACILLIN SODIUM AND TAZOBACTAM SODIUM 3375 MG: 3; .375 INJECTION, POWDER, LYOPHILIZED, FOR SOLUTION INTRAVENOUS at 03:49

## 2023-01-16 RX ADMIN — PIPERACILLIN SODIUM AND TAZOBACTAM SODIUM 3375 MG: 3; .375 INJECTION, POWDER, LYOPHILIZED, FOR SOLUTION INTRAVENOUS at 11:49

## 2023-01-16 RX ADMIN — ONDANSETRON 4 MG: 2 INJECTION INTRAMUSCULAR; INTRAVENOUS at 16:41

## 2023-01-16 RX ADMIN — SODIUM CHLORIDE, PRESERVATIVE FREE 10 ML: 5 INJECTION INTRAVENOUS at 20:26

## 2023-01-16 RX ADMIN — SODIUM CHLORIDE, PRESERVATIVE FREE 10 ML: 5 INJECTION INTRAVENOUS at 14:38

## 2023-01-16 RX ADMIN — PIPERACILLIN SODIUM AND TAZOBACTAM SODIUM 3375 MG: 3; .375 INJECTION, POWDER, LYOPHILIZED, FOR SOLUTION INTRAVENOUS at 23:56

## 2023-01-16 RX ADMIN — ENOXAPARIN SODIUM 30 MG: 100 INJECTION SUBCUTANEOUS at 20:23

## 2023-01-16 RX ADMIN — SODIUM CHLORIDE, PRESERVATIVE FREE 10 ML: 5 INJECTION INTRAVENOUS at 20:16

## 2023-01-16 RX ADMIN — INDOMETHACIN 50 MG: 50 SUPPOSITORY RECTAL at 17:27

## 2023-01-16 RX ADMIN — SUCCINYLCHOLINE CHLORIDE 140 MG: 20 INJECTION, SOLUTION INTRAMUSCULAR; INTRAVENOUS; PARENTERAL at 16:27

## 2023-01-16 RX ADMIN — ROCURONIUM BROMIDE 40 MG: 10 INJECTION INTRAVENOUS at 16:35

## 2023-01-16 RX ADMIN — CARVEDILOL 12.5 MG: 12.5 TABLET, FILM COATED ORAL at 09:02

## 2023-01-16 RX ADMIN — ROCURONIUM BROMIDE 10 MG: 10 INJECTION INTRAVENOUS at 16:27

## 2023-01-16 RX ADMIN — CARVEDILOL 12.5 MG: 12.5 TABLET, FILM COATED ORAL at 18:59

## 2023-01-16 RX ADMIN — SUGAMMADEX 200 MG: 100 INJECTION, SOLUTION INTRAVENOUS at 17:10

## 2023-01-16 RX ADMIN — POTASSIUM PHOSPHATE, MONOBASIC AND POTASSIUM PHOSPHATE, DIBASIC 30 MMOL: 224; 236 INJECTION, SOLUTION, CONCENTRATE INTRAVENOUS at 14:38

## 2023-01-16 RX ADMIN — FENTANYL CITRATE 50 MCG: 50 INJECTION, SOLUTION INTRAMUSCULAR; INTRAVENOUS at 16:45

## 2023-01-16 RX ADMIN — LIDOCAINE HYDROCHLORIDE 100 MG: 20 INJECTION, SOLUTION INTRAVENOUS at 16:27

## 2023-01-16 RX ADMIN — FENTANYL CITRATE 50 MCG: 50 INJECTION, SOLUTION INTRAMUSCULAR; INTRAVENOUS at 16:27

## 2023-01-16 ASSESSMENT — PAIN SCALES - GENERAL
PAINLEVEL_OUTOF10: 4
PAINLEVEL_OUTOF10: 0
PAINLEVEL_OUTOF10: 3
PAINLEVEL_OUTOF10: 0

## 2023-01-16 NOTE — ANESTHESIA POSTPROCEDURE EVALUATION
Department of Anesthesiology  Postprocedure Note    Patient: Neto Berman  MRN: [id number]  YOB: 1965  Date of evaluation: 1/16/2023      Procedure Summary     Date: 01/16/23 Room / Location: [facility]    Anesthesia Start: 1625 Anesthesia Stop: 2930    Procedure: ERCP DIAGNOSTIC Diagnosis:       Acute cholecystitis      (Acute cholecystitis [K81.0])    Surgeons: Ricki Burrows MD Responsible Provider: Perico Ritter MD    Anesthesia Type: general ASA Status: 3          Anesthesia Type: No value filed.     Chelsie Phase I: Chelsie Score: 9    Chelsie Phase II:        Anesthesia Post Evaluation    Patient location during evaluation: PACU  Patient participation: complete - patient participated  Level of consciousness: awake and alert  Pain score: 0  Airway patency: patent  Nausea & Vomiting: no nausea and no vomiting  Complications: no  Cardiovascular status: hemodynamically stable  Respiratory status: acceptable  Hydration status: euvolemic

## 2023-01-16 NOTE — PROGRESS NOTES
GI Progress Note      Selene Jim is a 59 y.o. female patient. 1. Abdominal pain, epigastric    2. Transaminitis    3. Biliary colic    4. Acute cholecystitis        Admit Date: 2023    Subjective:       No acute events overnight. Patient is POD2. She has no complaints. Denies abdominal pain nausea vomiting diarrhea. She is resting comfortably. ROS:  As per above    Scheduled Meds:   atorvastatin  40 mg Oral Daily    carvedilol  12.5 mg Oral BID WC    sodium chloride flush  10 mL IntraVENous 2 times per day    sodium chloride flush  5-40 mL IntraVENous 2 times per day    piperacillin-tazobactam  3,375 mg IntraVENous Q8H       Continuous Infusions:   sodium chloride      sodium chloride      lactated ringers 1,000 mL/hr at 01/15/23 1751       PRN Meds:  sodium chloride flush, sodium chloride, sodium chloride flush, sodium chloride, ondansetron **OR** ondansetron, oxyCODONE **OR** oxyCODONE, HYDROmorphone **OR** HYDROmorphone      Objective:       Patient Vitals for the past 24 hrs:   BP Temp Temp src Pulse Resp SpO2   23 0859 (!) 156/90 98.3 °F (36.8 °C) Oral 83 16 93 %   23 0350 115/65 98.4 °F (36.9 °C) Oral 78 16 --   01/15/23 2106 -- -- -- -- 17 --   01/15/23 202 128/75 97.4 °F (36.3 °C) Oral 86 16 94 %   01/15/23 1551 128/75 97.8 °F (36.6 °C) Oral 83 18 95 %   01/15/23 1550 -- -- -- -- -- (!) 89 %         Exam:  VITALS:  BP (!) 156/90   Pulse 83   Temp 98.3 °F (36.8 °C) (Oral)   Resp 16   Ht 5' 6\" (1.676 m)   Wt 239 lb (108.4 kg)   LMP 2016   SpO2 93%   BMI 38.58 kg/m²   TEMPERATURE:  Current - Temp: 98.3 °F (36.8 °C);  Max - Temp  Av °F (36.7 °C)  Min: 97.4 °F (36.3 °C)  Max: 98.4 °F (36.9 °C)      General appearance: alert, appears stated age, cooperative and no distress  Head: Normocephalic, without obvious abnormality, atraumatic  Neck: supple, symmetrical, trachea midline and thyroid not enlarged, symmetric, no tenderness/mass/nodules  CVS:  RRR, Nl s1s2  Lungs CTA Bilaterally, normal effort  Abdomen: soft, wounds dressed +BS  AAOx3, No asterixis or encephalopathy  Extremities: No edema. Recent Labs     01/14/23  0606 01/15/23  0736 01/16/23  0857   WBC 17.0* 9.3 6.2   HGB 11.5* 10.9* 11.0*   HCT 34.7* 33.0* 33.2*   MCV 83.8 86.1 82.8    167 180       Recent Labs     01/13/23  1932 01/14/23  0606 01/15/23  0736    135* 138   K 3.8 3.5 4.1   CL 96* 95* 102   CO2 23 29 28   PHOS 3.1 6.3* 2.7   BUN 18 20 10   CREATININE 0.7 0.9 <0.5*       Recent Labs     01/14/23  0606 01/14/23  1055 01/15/23  0736   * 551* 249*   * 627* 446*   BILIDIR 2.5* 3.6* 3.9*   BILITOT 3.7* 4.6* 5.4*   ALKPHOS 151* 147* 172*       Recent Labs     01/13/23  1507   LIPASE 76.0*       Recent Labs     01/14/23  0606 01/14/23  1055 01/15/23  0736   PROT 5.9* 5.4* 5.6*   INR 1.35*  --   --        No results for input(s): PTT in the last 72 hours. No results for input(s): OCCULTBLD in the last 72 hours. Assessment:       transaminitis    Recommendations:       Acute hepatitis panel reviewed - non reactive  Liver tests this morning are pending.   Plan for ERCP today     Reynaldo Manzo DO PGY-3  1/16/2023  9:52 AM    Will discuss with attending physician Dr. Young Cantu

## 2023-01-16 NOTE — ANESTHESIA PRE PROCEDURE
Department of Anesthesiology  Preprocedure Note       Name:  Christopher Jones   Age:  59 y.o.  :  1965                                          MRN:  [id number]         Date:  2023      Surgeon: Marcelino Martinez):  Du Horan MD    Procedure: Procedure(s):  ERCP DIAGNOSTIC    Medications prior to admission:   Prior to Admission medications    Medication Sig Start Date End Date Taking? Authorizing Provider   ondansetron (ZOFRAN ODT) 4 MG disintegrating tablet Take 1 tablet by mouth every 8 hours as needed for Nausea or Vomiting 21   FERNANDO Ortiz   atorvastatin (LIPITOR) 40 MG tablet Take 1 tablet by mouth daily 20   Historical Provider, MD   metFORMIN (GLUCOPHAGE) 500 MG tablet Take 2 tablets by mouth 2 times daily 20   Historical Provider, MD   losartan (COZAAR) 100 MG tablet Take 1 tablet by mouth daily 20   Historical Provider, MD   Semaglutide,0.25 or 0.5MG/DOS, (OZEMPIC, 0.25 OR 0.5 MG/DOSE,) 2 MG/1.5ML SOPN Inject 0.5 mg into the skin once a week 10/7/20   Historical Provider, MD   carvedilol (COREG) 12.5 MG tablet Take 12.5 mg by mouth 2 times daily (with meals)    Historical Provider, MD   potassium chloride 10 MEQ/100ML Infuse 10 mEq intravenously once  Patient not taking: Reported on 2023    Historical Provider, MD   Mag Aspart-Potassium Aspart (POTASSIUM & MAGNESIUM ASPARTAT) 250-250 MG CAPS Take by mouth  Patient not taking: Reported on 2023    Historical Provider, MD   HYDROcodone-acetaminophen (NORCO) 5-325 MG per tablet Take 1 tablet by mouth every 8 hours as needed for Pain  Patient not taking: Reported on 2023   FERNANDO Ortiz   naproxen (NAPROSYN) 500 MG tablet Take 1 tablet by mouth 2 times daily  Patient not taking: Reported on 2023   FERNANDO Ortiz   aspirin 81 MG EC tablet Take 1 tablet by mouth daily.   Patient not taking: Reported on 2023 3/9/11   Sugar Reddy MD   esomeprazole (NEXIUM) 40 MG capsule Take 40 mg by mouth every morning (before breakfast). Patient not taking: Reported on 1/13/2023    Historical Provider, MD   prenatal vitamin (PRENATAL-S) 27-0.8 MG TABS Take 1 tablet by mouth daily.   Patient not taking: Reported on 1/13/2023    Historical Provider, MD       Current medications:    Current Facility-Administered Medications   Medication Dose Route Frequency Provider Last Rate Last Admin    potassium phosphate 30 mmol in dextrose 5 % 250 mL IVPB  30 mmol IntraVENous Once Albin Prader, MD 41.7 mL/hr at 01/16/23 1438 30 mmol at 01/16/23 1438    atorvastatin (LIPITOR) tablet 40 mg  40 mg Oral Daily Sally Ballesteros DO   40 mg at 01/15/23 6420    carvedilol (COREG) tablet 12.5 mg  12.5 mg Oral BID WC Sally Ballesteros DO   12.5 mg at 01/16/23 0710    sodium chloride flush 0.9 % injection 10 mL  10 mL IntraVENous 2 times per day Amy Gaw, DO   10 mL at 01/16/23 1438    sodium chloride flush 0.9 % injection 10 mL  10 mL IntraVENous PRN Sally Ballesteros, DO        0.9 % sodium chloride infusion   IntraVENous PRN Sally Ballesteros, DO        sodium chloride flush 0.9 % injection 5-40 mL  5-40 mL IntraVENous 2 times per day Amy Gaw, DO        sodium chloride flush 0.9 % injection 5-40 mL  5-40 mL IntraVENous PRN Sally Ballesteros, DO        0.9 % sodium chloride infusion   IntraVENous PRN Sally Ballesteros, DO        ondansetron (ZOFRAN-ODT) disintegrating tablet 4 mg  4 mg Oral Q8H PRN Sally Ballesteros, DO   4 mg at 01/15/23 1554    Or    ondansetron (ZOFRAN) injection 4 mg  4 mg IntraVENous Q6H PRN Sally Ballesteros, DO        oxyCODONE (ROXICODONE) immediate release tablet 5 mg  5 mg Oral Q4H PRN Sally Ballesteros,         Or    oxyCODONE (ROXICODONE) immediate release tablet 10 mg  10 mg Oral Q4H PRN Sally Ballesteros, DO   10 mg at 01/15/23 2036    HYDROmorphone (DILAUDID) injection 0.25 mg  0.25 mg IntraVENous Q3H PRN Sally Ballesteros DO        Or    HYDROmorphone (DILAUDID) injection 0.5 mg  0.5 mg IntraVENous Q3H PRN Yesica Rosemarie Wileyki, DO        lactated ringers infusion   IntraVENous Continuous Yesicaslick Ballesteros, DO 1,000 mL/hr at 01/15/23 1751 Rate Verify at 01/15/23 1751    piperacillin-tazobactam (ZOSYN) 3,375 mg in dextrose 5 % 50 mL IVPB extended infusion (mini-bag)  3,375 mg IntraVENous Q8H Dave Ballesteros, DO 12.5 mL/hr at 23 1149 3,375 mg at 23 1149       Allergies:  No Known Allergies    Problem List:    Patient Active Problem List   Diagnosis Code    CHF (congestive heart failure) ([facility]) I50.9    Hypertension I10    GERD (gastroesophageal reflux disease) K21.9    Cardiomyopathy, peripartum, antepartum 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.3    Acute cholecystitis K81.0       Past Medical History:        Diagnosis Date    [facility]     [facility]estive [facility] failure) [facility]     DM (diabetes mellitus) [facility]     GERD (gastroesophageal reflux disease)     Hypertension        Past Surgical History:        Procedure Laterality Date     SECTION      CHOLECYSTECTOMY, LAPAROSCOPIC N/A 2023    CHOLECYSTECTOMY LAPAROSCOPIC WITH INTRA-OPERATIVE CHOLANGIOGRAM, TRANSCYSTIC CATHETER BILE DUCT EXPLORATION performed by Carol Deal MD at [facility] History:    Social History     Tobacco Use    Smoking status: Never    Smokeless tobacco: Never   Substance Use Topics    Alcohol use:  No                                Counseling given: Not Answered      Vital Signs (Current):   Vitals:    01/15/23 2024 01/15/23 2106 23 0350 23 0859   BP: 128/75  115/65 (!) 156/90   Pulse: 86  78 83   Resp: 16  16 16   Temp: 97.4 °F (36.3 °C)  98.4 °F (36.9 °C) 98.3 °F (36.8 °C)   TempSrc: Oral  Oral Oral   SpO2: 94%   93%   Weight:       Height:                                                  BP Readings from Last 3 Encounters:   23 (!) 156/90   21 128/71   16 135/68       NPO Status: Time of last liquid consumption: 1130 (just sip of water w/ Tylenol) Date of last liquid consumption: 01/14/23                        Date of last solid food consumption: 01/12/23    BMI:   Wt Readings from Last 3 Encounters:   01/15/23 239 lb (108.4 kg)   01/19/21 220 lb (99.8 kg)   02/20/16 250 lb (113.4 kg)     Body mass index is 38.58 kg/m².     CBC:   Lab Results   Component Value Date/Time    WBC 6.2 01/16/2023 08:57 AM    RBC 4.01 01/16/2023 08:57 AM    HGB 11.0 01/16/2023 08:57 AM    HCT 33.2 01/16/2023 08:57 AM    MCV 82.8 01/16/2023 08:57 AM    RDW 14.7 01/16/2023 08:57 AM     01/16/2023 08:57 AM       CMP:   Lab Results   Component Value Date/Time     01/16/2023 08:57 AM    K 3.4 01/16/2023 08:57 AM    K 9.6 01/13/2023 03:07 PM    CL 98 01/16/2023 08:57 AM    CO2 30 01/16/2023 08:57 AM    BUN 9 01/16/2023 08:57 AM    CREATININE 0.7 01/16/2023 08:57 AM    GFRAA >60 01/19/2021 09:36 PM    GFRAA >60 04/02/2011 12:01 PM    AGRATIO 1.0 01/13/2023 03:07 PM    LABGLOM >60 01/16/2023 08:57 AM    GLUCOSE 182 01/16/2023 08:57 AM    PROT 6.2 01/16/2023 08:57 AM    PROT 6.6 03/08/2011 06:10 AM    CALCIUM 8.9 01/16/2023 08:57 AM    BILITOT 4.0 01/16/2023 08:57 AM    ALKPHOS 315 01/16/2023 08:57 AM     01/16/2023 08:57 AM     01/16/2023 08:57 AM       POC Tests:   Recent Labs     01/14/23  1617   POCGLU 197*       Coags:   Lab Results   Component Value Date/Time    PROTIME 16.6 01/14/2023 06:06 AM    INR 1.35 01/14/2023 06:06 AM    APTT 28.8 03/07/2011 01:10 AM       HCG (If Applicable):   Lab Results   Component Value Date    PREGTESTUR Negative 01/14/2023        ABGs: No results found for: PHART, PO2ART, EWJ9CVJ, OJL2WOT, BEART, Z5WXKYPP     Type & Screen (If Applicable):  No results found for: LABABO, LABRH    Drug/Infectious Status (If Applicable):  No results found for: HIV, HEPCAB    COVID-19 Screening (If Applicable):   Lab Results   Component Value Date/Time    COVID19 Detected 01/20/2021 12:55 AM Anesthesia Evaluation  Patient summary reviewed and Nursing notes reviewed no history of anesthetic complications:   Airway: Mallampati: II  TM distance: >3 FB   Neck ROM: full  Mouth opening: > = 3 FB   Dental:          Pulmonary:                              Cardiovascular:    (+) hypertension:, CHF:,                   Neuro/Psych:               GI/Hepatic/Renal:   (+) GERD:,           Endo/Other:    (+) DiabetesType II DM, , .                 Abdominal:             Vascular: Other Findings:           Anesthesia Plan      general     ASA 1    (66-year-old female presents for ERCP. Plan general anesthesia with ASA standard monitors. Questions answered. Patient agreeable with anesthetic plan.    )  Induction: intravenous. Anesthetic plan and risks discussed with patient. Plan discussed with CRNA.     Attending anesthesiologist reviewed and agrees with Elba Garrett MD   1/16/2023

## 2023-01-16 NOTE — PROGRESS NOTES
9302 Admitted to PACU from [address] [place] [address]. Connected to monitor. Report at bedside. Oral airway removed on arrival. Denies pain and nausea. Wants to return to room to eat.

## 2023-01-16 NOTE — PROCEDURES
ERCP Note    Patient: Carmelita Cruz  : 1965  CSN: [id number]    Procedure: ERCP, sphincterotomy, balloon pull-through, biliary stent placement. Date:  2023     Surgeon:  Rigoberto Mckenzie MD     Referring Physician:  Jana Jones    Preoperative Diagnosis:  Acute cholecystitis [K81.0]    Postoperative Diagnosis:  * No post-op diagnosis entered *    Anesthesia:  MAC    EBL: minimal to none. Indications: This is a 59y.o. year old female who presents today with choledocholithiasis, elevated liver function test, abdominal pain. Status post laparoscopic cholecystectomy with Intra-Op cholangiogram showing filling defects in the distal common bile duct. Laila Guevara Description of Procedure:  Informed consent was obtained from the patient after explanation of indications, benefits and possible risks and complications of the procedure. The patient was then taken to the endoscopy suite, placed in the left lateral decubitus position and the above IV sedation was administrered. The Olympus video endoscope was passed through the hypopharynx into the esophagus. The scope was advanced all the way to the second portion of the duodenum. The GE junction was at approximately 38 cms. major ampulla was identified in the second part of the duodenum and cannulation of the major ampulla/common bile duct was achieved on first attempt with a sphincterotome loaded with a 0.035 Jagwire. Cholangiogram was obtained which demonstrated filling defects in the distal common bile duct in the mid common bile duct. A generous sphincterotomy was done followed by a gush of bile. The sphincterotome was exchanged for a balloon catheter and multiple balloon pull-through's were performed in multiple stones and debris were removed from the common bile duct. Intra-Op cholangiogram confirmed complete clearing of the common bile duct. Right and left common hepatic ducts were opacified and were without obstruction.   Thereafter, using standard technique a 7 [employer] [place], 5 cm double-pigtail stent was placed in the common bile duct extending into the duodenal lumen. All wires and catheters were removed. Patient tolerated procedure well. Pancreatic duct was not cannulated or opacified. Gastric or Duodenal ulcer present: No    The patient tolerated the procedure well and was taken to the post anesthesia care unit in good condition. There were no immediate complications. Impression:    Choledocholithiasis. Sphincterotomy was performed and common bile duct stones were removed. Biliary stent was placed. Recommendations:   EGD with biliary stent removal in 6 weeks is recommended. Clear liquid diet-advance as tolerated. Please call if we can be of further assistance. Balbir Nguyen MD, MD  [address]    Please note that some or all of this record was generated using voice recognition software. If there are any questions about the content of this document, please contact the author as some errors in translation may have occurred.

## 2023-01-16 NOTE — PROGRESS NOTES
General Surgery   Daily Progress Note  Patient: Lakeshia Bower      CC: acute cholecystitis    SUBJECTIVE:   Patient with no acute events overnight. Afebrile, HDS.     ROS:   A 14 point review of systems was conducted, significant findings as noted above. All other systems negative. OBJECTIVE:    PHYSICAL EXAM:    Vitals:    01/15/23 1551 01/15/23 2024 01/15/23 2106 01/16/23 0350   BP: 128/75 128/75  115/65   Pulse: 83 86  78   Resp: 18 16 17 16   Temp: 97.8 °F (36.6 °C) 97.4 °F (36.3 °C)  98.4 °F (36.9 °C)   TempSrc: Oral Oral  Oral   SpO2: 95% 94%     Weight:       Height:         General appearance: Alert, no acute distress, grooming appropriate  Chest/Lungs: Symmetrical chest ride, normal effort, on 1L NC  Cardiovascular: RRR  Abdomen: Soft, appropriately tender, non-distended, incisions c/d/i  Extremities: No edema, no cyanosis  Neuro: A&Ox3, no focal deficits, sensation intact    LABS:   Recent Labs     01/14/23  0606 01/15/23  0736   WBC 17.0* 9.3   HGB 11.5* 10.9*   HCT 34.7* 33.0*   MCV 83.8 86.1    167          Recent Labs     01/14/23  0606 01/15/23  0736   * 138   K 3.5 4.1   CL 95* 102   CO2 29 28   PHOS 6.3* 2.7   BUN 20 10   CREATININE 0.9 <0.5*          Recent Labs     01/14/23  1055 01/15/23  0736   * 249*   * 446*   BILIDIR 3.6* 3.9*   BILITOT 4.6* 5.4*   ALKPHOS 147* 172*          Recent Labs     01/13/23  1507   LIPASE 76.0*          Recent Labs     01/14/23  0606 01/14/23  1055 01/15/23  0736   PROT 5.9* 5.4* 5.6*   INR 1.35*  --   --         No results for input(s): CKTOTAL, CKMB, CKMBINDEX, TROPONINI in the last 72 hours.       ASSESSMENT & PLAN:   This is a 59y.o. year old female with a diagnosis of acute cholecystitis s/p lap rosaura with IOC and possible retained CBD stone 01/15; POD #1    - ERCP today with GI for concerns of retained stone and increasing bilirubin  - Continue NPO, IVF until after ERCP   - Encourage IS, deep breathing  - Encourage OOB, ambulation      Lili Zhang MD  PGY1, General Surgery  01/16/23  7:15 AM  [phone]

## 2023-01-16 NOTE — PLAN OF CARE
Problem: Chronic Conditions and Co-morbidities  Goal: Patient's chronic conditions and co-morbidity symptoms are monitored and maintained or improved  1/15/2023 2013 by Juan Alvarado RN  Outcome: Progressing     Problem: Discharge Planning  Goal: Discharge to home or other facility with appropriate resources  1/15/2023 2013 by Juan Alvarado RN  Outcome: Progressing     Problem: Pain  Goal: Verbalizes/displays adequate comfort level or baseline comfort level  1/15/2023 2013 by Juan Alvarado RN  Outcome: Progressing     Problem: Safety - Adult  Goal: Free from fall injury  1/15/2023 2013 by Juan Alvarado RN  Outcome: Progressing     Problem: ABCDS Injury Assessment  Goal: Absence of physical injury  1/15/2023 2013 by Juan Alvarado RN  Outcome: Progressing

## 2023-01-16 NOTE — PROGRESS NOTES
PACU Transfer Note    Vitals:    01/16/23 1805   BP: (!) 167/96   Pulse: 73   Resp: 17   Temp: 100.3 °F (37.9 °C)   SpO2: 94%     BP within 20% of pre-op 167/89. Patient reports needs her BP meds. Did not take r/t NPO. In: 750 [I.V.:750]  Out: 1800 [Urine:1800]    Pain assessment:  none  Pain Level: 0    Report given to Receiving unit RN. Waiting for transportation.   1/16/2023 6:13 PM

## 2023-01-17 VITALS
OXYGEN SATURATION: 94 % | TEMPERATURE: 98.1 F | SYSTOLIC BLOOD PRESSURE: 145 MMHG | BODY MASS INDEX: 37.84 KG/M2 | WEIGHT: 235.45 LBS | HEART RATE: 68 BPM | DIASTOLIC BLOOD PRESSURE: 70 MMHG | HEIGHT: 66 IN | RESPIRATION RATE: 18 BRPM

## 2023-01-17 LAB
ALBUMIN SERPL-MCNC: 3.1 G/DL (ref 3.4–5)
ALP BLD-CCNC: 295 U/L (ref 40–129)
ALT SERPL-CCNC: 260 U/L (ref 10–40)
ANION GAP SERPL CALCULATED.3IONS-SCNC: 9 MMOL/L (ref 3–16)
AST SERPL-CCNC: 104 U/L (ref 15–37)
BASOPHILS ABSOLUTE: 0 K/UL (ref 0–0.2)
BASOPHILS RELATIVE PERCENT: 0 %
BILIRUB SERPL-MCNC: 2.9 MG/DL (ref 0–1)
BILIRUBIN DIRECT: 2.1 MG/DL (ref 0–0.3)
BILIRUBIN, INDIRECT: 0.8 MG/DL (ref 0–1)
BLOOD CULTURE, ROUTINE: ABNORMAL
BLOOD CULTURE, ROUTINE: ABNORMAL
BUN BLDV-MCNC: 9 MG/DL (ref 7–20)
CALCIUM SERPL-MCNC: 8.7 MG/DL (ref 8.3–10.6)
CHLORIDE BLD-SCNC: 99 MMOL/L (ref 99–110)
CO2: 29 MMOL/L (ref 21–32)
CREAT SERPL-MCNC: 0.7 MG/DL (ref 0.6–1.1)
EOSINOPHILS ABSOLUTE: 0 K/UL (ref 0–0.6)
EOSINOPHILS RELATIVE PERCENT: 0 %
GFR SERPL CREATININE-BSD FRML MDRD: >60 ML/MIN/{1.73_M2}
GLUCOSE BLD-MCNC: 190 MG/DL (ref 70–99)
HCT VFR BLD CALC: 30.9 % (ref 36–48)
HEMOGLOBIN: 10.3 G/DL (ref 12–16)
LYMPHOCYTES ABSOLUTE: 1.3 K/UL (ref 1–5.1)
LYMPHOCYTES RELATIVE PERCENT: 26 %
MAGNESIUM: 1.8 MG/DL (ref 1.8–2.4)
MCH RBC QN AUTO: 27.8 PG (ref 26–34)
MCHC RBC AUTO-ENTMCNC: 33.3 G/DL (ref 31–36)
MCV RBC AUTO: 83.5 FL (ref 80–100)
METAMYELOCYTES RELATIVE PERCENT: 1 %
MICROCYTES: ABNORMAL
MONOCYTES ABSOLUTE: 0.3 K/UL (ref 0–1.3)
MONOCYTES RELATIVE PERCENT: 7 %
NEUTROPHILS ABSOLUTE: 3.3 K/UL (ref 1.7–7.7)
NEUTROPHILS RELATIVE PERCENT: 66 %
ORGANISM: ABNORMAL
OVALOCYTES: ABNORMAL
PDW BLD-RTO: 14.9 % (ref 12.4–15.4)
PHOSPHORUS: 2.7 MG/DL (ref 2.5–4.9)
PLATELET # BLD: 180 K/UL (ref 135–450)
PMV BLD AUTO: 9.3 FL (ref 5–10.5)
POTASSIUM SERPL-SCNC: 3.4 MMOL/L (ref 3.5–5.1)
RBC # BLD: 3.7 M/UL (ref 4–5.2)
SCHISTOCYTES: ABNORMAL
SODIUM BLD-SCNC: 137 MMOL/L (ref 136–145)
TOTAL PROTEIN: 5.9 G/DL (ref 6.4–8.2)
WBC # BLD: 4.9 K/UL (ref 4–11)

## 2023-01-17 PROCEDURE — 6370000000 HC RX 637 (ALT 250 FOR IP): Performed by: STUDENT IN AN ORGANIZED HEALTH CARE EDUCATION/TRAINING PROGRAM

## 2023-01-17 PROCEDURE — 80076 HEPATIC FUNCTION PANEL: CPT

## 2023-01-17 PROCEDURE — 6360000002 HC RX W HCPCS: Performed by: STUDENT IN AN ORGANIZED HEALTH CARE EDUCATION/TRAINING PROGRAM

## 2023-01-17 PROCEDURE — 80069 RENAL FUNCTION PANEL: CPT

## 2023-01-17 PROCEDURE — 2580000003 HC RX 258: Performed by: STUDENT IN AN ORGANIZED HEALTH CARE EDUCATION/TRAINING PROGRAM

## 2023-01-17 PROCEDURE — 85025 COMPLETE CBC W/AUTO DIFF WBC: CPT

## 2023-01-17 PROCEDURE — 36415 COLL VENOUS BLD VENIPUNCTURE: CPT

## 2023-01-17 PROCEDURE — 83735 ASSAY OF MAGNESIUM: CPT

## 2023-01-17 RX ORDER — MAGNESIUM SULFATE IN WATER 40 MG/ML
2000 INJECTION, SOLUTION INTRAVENOUS ONCE
Status: COMPLETED | OUTPATIENT
Start: 2023-01-17 | End: 2023-01-17

## 2023-01-17 RX ORDER — AMOXICILLIN AND CLAVULANATE POTASSIUM 875; 125 MG/1; MG/1
1 TABLET, FILM COATED ORAL 2 TIMES DAILY
Qty: 10 TABLET | Refills: 0 | Status: SHIPPED | OUTPATIENT
Start: 2023-01-17 | End: 2023-01-22

## 2023-01-17 RX ORDER — OXYCODONE HYDROCHLORIDE 5 MG/1
5 TABLET ORAL EVERY 6 HOURS PRN
Qty: 20 TABLET | Refills: 0 | Status: SHIPPED | OUTPATIENT
Start: 2023-01-17 | End: 2023-01-24

## 2023-01-17 RX ORDER — CALCIUM CARBONATE 200(500)MG
500 TABLET,CHEWABLE ORAL 3 TIMES DAILY PRN
Status: DISCONTINUED | OUTPATIENT
Start: 2023-01-17 | End: 2023-01-17 | Stop reason: HOSPADM

## 2023-01-17 RX ORDER — POTASSIUM CHLORIDE 20 MEQ/1
40 TABLET, EXTENDED RELEASE ORAL ONCE
Status: COMPLETED | OUTPATIENT
Start: 2023-01-17 | End: 2023-01-17

## 2023-01-17 RX ADMIN — POTASSIUM CHLORIDE 40 MEQ: 1500 TABLET, EXTENDED RELEASE ORAL at 09:19

## 2023-01-17 RX ADMIN — SODIUM CHLORIDE, POTASSIUM CHLORIDE, SODIUM LACTATE AND CALCIUM CHLORIDE: 600; 310; 30; 20 INJECTION, SOLUTION INTRAVENOUS at 04:25

## 2023-01-17 RX ADMIN — ATORVASTATIN CALCIUM 40 MG: 40 TABLET, FILM COATED ORAL at 09:19

## 2023-01-17 RX ADMIN — MAGNESIUM SULFATE HEPTAHYDRATE 2000 MG: 40 INJECTION, SOLUTION INTRAVENOUS at 10:08

## 2023-01-17 RX ADMIN — DIBASIC SODIUM PHOSPHATE, MONOBASIC POTASSIUM PHOSPHATE AND MONOBASIC SODIUM PHOSPHATE 2 TABLET: 852; 155; 130 TABLET ORAL at 10:08

## 2023-01-17 RX ADMIN — PIPERACILLIN SODIUM AND TAZOBACTAM SODIUM 3375 MG: 3; .375 INJECTION, POWDER, LYOPHILIZED, FOR SOLUTION INTRAVENOUS at 09:15

## 2023-01-17 RX ADMIN — ANTACID TABLETS 500 MG: 500 TABLET, CHEWABLE ORAL at 01:21

## 2023-01-17 RX ADMIN — CARVEDILOL 12.5 MG: 12.5 TABLET, FILM COATED ORAL at 09:20

## 2023-01-17 ASSESSMENT — PAIN DESCRIPTION - PAIN TYPE: TYPE: ACUTE PAIN;SURGICAL PAIN

## 2023-01-17 ASSESSMENT — PAIN - FUNCTIONAL ASSESSMENT: PAIN_FUNCTIONAL_ASSESSMENT: ACTIVITIES ARE NOT PREVENTED

## 2023-01-17 ASSESSMENT — PAIN SCALES - GENERAL
PAINLEVEL_OUTOF10: 0
PAINLEVEL_OUTOF10: 2

## 2023-01-17 ASSESSMENT — PAIN DESCRIPTION - ONSET: ONSET: ON-GOING

## 2023-01-17 ASSESSMENT — PAIN DESCRIPTION - FREQUENCY: FREQUENCY: INTERMITTENT

## 2023-01-17 ASSESSMENT — PAIN DESCRIPTION - DESCRIPTORS: DESCRIPTORS: SORE

## 2023-01-17 ASSESSMENT — PAIN DESCRIPTION - LOCATION: LOCATION: ABDOMEN

## 2023-01-17 ASSESSMENT — PAIN DESCRIPTION - ORIENTATION: ORIENTATION: MID

## 2023-01-17 NOTE — PROGRESS NOTES
General Surgery   Daily Progress Note  Patient: Faisal Rojas      CC: acute cholecystitis    SUBJECTIVE:   Patient with no acute events overnight. Afebrile, HDS. Passing flatus. Tolerating diet. ROS:   A 14 point review of systems was conducted, significant findings as noted above. All other systems negative. OBJECTIVE:    PHYSICAL EXAM:    Vitals:    01/16/23 1845 01/16/23 2014 01/16/23 2358 01/17/23 0422   BP: (!) 168/101 (!) 161/71 (!) 154/65    Pulse: 77 72 78    Resp: 16 18 18    Temp: 98.5 °F (36.9 °C) 98.3 °F (36.8 °C) 98.5 °F (36.9 °C)    TempSrc: Oral Oral Oral    SpO2: 93% 93% 93% 93%   Weight:    235 lb 7.2 oz (106.8 kg)   Height:         General appearance: Alert, no acute distress, grooming appropriate  Chest/Lungs: Symmetrical chest rise, normal effort, on RA  Cardiovascular: RRR  Abdomen: Soft, appropriately tender, non-distended, incisions c/d/i  Extremities: No edema, no cyanosis  Neuro: A&Ox3, no focal deficits, sensation intact    LABS:   Recent Labs     01/16/23  0857 01/17/23  0530   WBC 6.2 4.9   HGB 11.0* 10.3*   HCT 33.2* 30.9*   MCV 82.8 83.5    180          Recent Labs     01/16/23  0857 01/17/23  0530    137   K 3.4* 3.4*   CL 98* 99   CO2 30 29   PHOS 2.0* 2.7   BUN 9 9   CREATININE 0.7 0.7          Recent Labs     01/16/23  0857 01/17/23  0530   * 104*   * 260*   BILIDIR 3.3* 2.1*   BILITOT 4.0* 2.9*   ALKPHOS 315* 295*          No results for input(s): LIPASE, AMYLASE in the last 72 hours. Recent Labs     01/16/23  0857 01/17/23  0530   PROT 6.2* 5.9*        No results for input(s): CKTOTAL, CKMB, CKMBINDEX, TROPONINI in the last 72 hours.       ASSESSMENT & PLAN:   This is a 59y.o. year old female with a diagnosis of acute cholecystitis s/p lap rosaura with IOC and possible retained CBD stone 01/15; POD #2    - ERCP resulted in sphincterotomy and biliary stent  - continue diet and DVT ppx   - Encourage IS, deep breathing  - Encourage OOB, ambulation  - Dispo: today vs tomorrow       Adriana Cortez DO  PGY1, General Surgery  01/17/23  7:04 AM  [employer]  Pager: [phone]

## 2023-01-17 NOTE — PROGRESS NOTES
Pt discharged home with daughter. IV removed. Pt verbalized understanding of follow up appointments, medications and post surgery restrictions. Medications provided via meds to beds.

## 2023-01-17 NOTE — PROGRESS NOTES
Following secure message sent to surgical resident:    Patient admitted with abdominal pain and nausea. She had a rosaura this past Sat with an ERCP yesterday. She is tolerating full liquids but is having some indigestion after tomato soup. May we have an order for tums.     Electronically signed by Dl Rainey RN on 1/17/2023 at 12:07 AM

## 2023-01-17 NOTE — PROGRESS NOTES
Patient is A&O x4.  RA, sat 93%. No complaints of pain or SOB at this time. Respirations appear to easy and unlabored. Lungs clear. Respirations easy with no complaints of cough. No complaints of nausea/vomiting/diarrhea. Up ad mando to the bathroom/BSC as needed. IVF started per new left hand PIV as ordered. Abdominal incisions intact with surgical glue. Tolerating clear liquid diet, advancing to full liquids. Plan of care and safety measures reviewed with the patient. Call light in reach. Will continue to monitor.   Electronically signed by José Luis Traore RN on 1/16/2023 at 11:05 PM

## 2023-01-18 ENCOUNTER — TELEPHONE (OUTPATIENT)
Dept: SURGERY | Age: 58
End: 2023-01-18

## 2023-01-18 LAB — CULTURE, BLOOD 2: NORMAL

## 2023-01-18 NOTE — PROGRESS NOTES
[facility] Surgical Oncology and General Surgery  [facility], [address] [place] [address] Drive [address]  Phone: [phone]  Fax: [phone]      Visit Date: 1/27/2023    Subjective:     Radha Zelaya is a 59 y.o. female here for postoperative visit after laparoscopic cholecystectomy with intra-operative cholangiogram, transcystic catheter bile duct exploration 1/14/23 with Dr. Gigi Palomo. Objective:     BP (!) 151/85 (Site: Right Upper Arm)   Pulse 89   Temp 98.7 °F (37.1 °C) (Temporal)   Ht 5' 6\" (1.676 m)   Wt 236 lb (107 kg)   LMP 02/12/2016   BMI 38.09 kg/m²     General:  Alert, oriented x 3, cooperative. Skin: Skin color, texture, turgor normal.    Lymph nodes: Cervical, supraclavicular, and axillary nodes normal.   HENT:  Normocephalic, without obvious abnormality. Moist mucus membranes. No icterus. Lungs: No respiratory distress. Abdomen: Soft, non-tender. No masses,  No organomegaly. Laparoscopic incisions well-healed. No erythema, no drainage. Nontender. Path- FINAL DIAGNOSIS:   Gallbladder, cholecystectomy:   -Acute and chronic cholecystitis with adenomyosis and cholelithiasis. Assessment/Plan:      Diagnosis Orders   1. Acute cholecystitis        2. Encounter for post surgical wound check            Doing well with no post-operative complications. Path as above reviewed with patient. Follow up as needed.        Electronically signed by SORAYA Lockhart CNP on 1/27/2023 at 11:38 AM

## 2023-01-18 NOTE — DISCHARGE SUMMARY
Physician Discharge Summary     Patient ID:  Amauri Castaneda  [id number]  11 y.o.  1965    Admit date: 1/13/2023    Discharge date and time: 1/17/2023  4:05 PM     Admitting Physician: Dari Rick MD     Discharge Physician: same    Admission Diagnoses: Acute cholecystitis [K81.0]  Abdominal pain, epigastric [I88.77]  Biliary colic [J94.76]  Transaminitis [R74.01]    Discharge Diagnoses: same    Admission Condition: fair    Discharged Condition: stable    Indication for Admission: Amauri Castaneda is a 59 y.o. female with Hx of Cardiomyopathy, HTN, T2DM, w/ multiple c-sections who presents with epigastric pain. Pt states that she was shopping at the grocery store earlier today when she developed epigastric pain with nausea suddenly, and subsequently two episodes of emesis. In the ED vital signs are unremarkable, however, lab results are notable for a WBC of 12.4, , , Total bilirubin 1.4, Lipase 76. CT A&P shows a dilated gallbladder with cholelithiasis without signs of cholecystitis. In the ED, the pt states her pain has improved since receiving PRN medications in the ED. Hospital Course: Amauri Castaneda is a 59 y.o. female with Hx of Cardiomyopathy, HTN, T2DM, w/ multiple c-sections who presents with epigastric pain. Pt is doing better now however with a hx of symptomatic cholelithiasis pt would likely benefit from cholecystectomy. Pt indicates she is interested in surgery but would like to wait and see how she feels. Admit pt. Keep NPO with sips. Antibiotics ordered. Consent pt for procedure in the event pt wants to move forward with surgery. Gastroenterology was consulted for elevated liver enzymes.    On 1/14 shew went to the OR where she underwent Laparoscopic cholecystectomy with intra-operative cholangiogram, transcystic catheter bile duct exploration and findings included acute cholecystitis, transcystic duct CBD exploration with Chao catheter resulting in 4 stones retrieved, purulence expressed, IOC with filling defect. The next day her bilirubin was up so the decision was made to do an ERCP for retained stone. During this procedure it was determined she had choledocholithiasis. A sphincterotomy was performed and common bile duct stones were removed. A biliary stent was placed. Antibiotics were continued. The next day her liver function test improved. She was tolerating a diet without any associated nausea or vomiting. She was discharged with antibiotics. She was discharged home in stable condition. Disposition: home    Patient Instructions: Activity: no driving while on analgesics and no heavy lifting for 6 weeks  Diet: regular diet  Wound Care: as directed    Follow-up with Dr. Keyona Turpin in 2 weeks.     Signed:  SORAYA Magaña - CNP  1/18/2023  8:35 AM

## 2023-01-19 PROBLEM — E78.2 MIXED HYPERLIPIDEMIA: Status: ACTIVE | Noted: 2017-09-25

## 2023-01-19 PROBLEM — M75.101 TEAR OF RIGHT ROTATOR CUFF: Status: ACTIVE | Noted: 2017-08-04

## 2023-01-19 PROBLEM — E55.9 VITAMIN D DEFICIENCY: Status: ACTIVE | Noted: 2017-09-25

## 2023-01-19 PROBLEM — E66.01 SEVERE OBESITY (BMI 35.0-39.9) WITH COMORBIDITY (HCC): Status: ACTIVE | Noted: 2022-06-22

## 2023-01-19 PROBLEM — D17.21 LIPOMA OF RIGHT UPPER EXTREMITY: Status: ACTIVE | Noted: 2022-06-22

## 2023-01-19 PROBLEM — E11.9 TYPE 2 DIABETES MELLITUS WITHOUT COMPLICATION, WITHOUT LONG-TERM CURRENT USE OF INSULIN (HCC): Status: ACTIVE | Noted: 2017-09-25

## 2023-01-19 RX ORDER — POTASSIUM CHLORIDE 750 MG/1
TABLET, FILM COATED, EXTENDED RELEASE ORAL
COMMUNITY

## 2023-01-27 ENCOUNTER — OFFICE VISIT (OUTPATIENT)
Dept: SURGERY | Age: 58
End: 2023-01-27

## 2023-01-27 VITALS
BODY MASS INDEX: 37.93 KG/M2 | HEART RATE: 89 BPM | TEMPERATURE: 98.7 F | WEIGHT: 236 LBS | SYSTOLIC BLOOD PRESSURE: 151 MMHG | HEIGHT: 66 IN | DIASTOLIC BLOOD PRESSURE: 85 MMHG

## 2023-01-27 DIAGNOSIS — Z48.89 ENCOUNTER FOR POST SURGICAL WOUND CHECK: ICD-10-CM

## 2023-01-27 DIAGNOSIS — K81.0 ACUTE CHOLECYSTITIS: Primary | ICD-10-CM

## 2023-01-27 PROCEDURE — 99024 POSTOP FOLLOW-UP VISIT: CPT | Performed by: NURSE PRACTITIONER

## 2023-02-03 PROBLEM — K80.47 CALCULUS OF BILE DUCT WITH ACUTE ON CHRONIC CHOLECYSTITIS WITH OBSTRUCTION: Status: ACTIVE | Noted: 2023-02-03

## 2023-02-03 NOTE — OP NOTE
Operative Note      Patient: Amy Cat  YOB: 1965  MRN: [id number]    Date of Procedure: 1/14/2023    Pre-Op Diagnosis: Acute cholecystitis [K81.0]    Post-Op Diagnosis: Same and bile duct stones       Procedure(s):  CHOLECYSTECTOMY LAPAROSCOPIC WITH INTRA-OPERATIVE CHOLANGIOGRAM, TRANSCYSTIC CATHETER BILE DUCT EXPLORATION    Surgeon(s):  Matilde Manriquez MD    Assistant:   Resident: Jere Crain DO    Anesthesia: General    Estimated Blood Loss (mL): less than 50     Complications: None    Specimens:   ID Type Source Tests Collected by Time Destination   A : GALLBLADDER AND CONTENTS Tissue Tissue SURGICAL PATHOLOGY Matilde Manriquez MD 1/14/2023 1513      Findings:  Acute cholecystitis, transcystic duct CBD exploration with Chao catheter resulting in 4 stones retrieved, purulence expressed, IOC with filling defect initially and improved after flushing, but possible retained stone. Operative indications: Patient presented to ER with symptoms suggestive of gall bladder disease and likely acute cholecystitis and possible bile duct stones. Robotic-assisted laparoscopic cholecystectomy with intraoperative cholangiogram possible open explained. Risks, benefits & alternatives of surgery explained and patient wishes to proceed with surgery. Operative procedure: The patient was brought from floor and placed in a supine position. After induction of anesthesia the abdomen was prepped and draped in a sterile manner. Timeout procedure was performed including giving of antibiotics. A small LUQ incision given. Peritoneal cavity entered by optiview technique. A 5 mm trocar canula placed into abdominal cavity without difficulty and under direct visualization. The abdomen was insufflated to 15 mm of Hg pressure. The other standard operating trocars were placed under direct visualization. Patient cart of the robotic system was docked. Omental adhesions were released.  Duodenum was sharply . From surgeon consol the gall bladder fundus was grasped with a fine grasper and delivered over the inferior border of the liver. Peritoneum over Calot's triangle incised and dissection done. Calot's triangle was identified and the cystic duct dissected free. Biliary anatomy was identified by ICG firefly imaging. Cystic artery is controlled with bipolar device and divided. A clip was applied to the gall bladder side of the duct and a small ductotomy was made. Sludge came out on milking bile duct. Decision made to explore bile duct with Chao catheter. Transcystic bile duct exploration done. Multiple passes of Chao catheter made. At least 4 stones and large amount of sludge removed. A cholangiogram catheter was passed and cholangiogram performed. It showed free flow into the duodenum but possible filling defect. Bile duct flushed well and plan is to follow postop LFTs. The catheter was removed and two clips applied to the common duct side of this opening in cystic duct. The gall bladder was then removed from the gall bladder fossa using electrocautery. Hemostasis checked and achieved. The patient cart was undocked. Gallbladder was removed by placing in a bag from the supraumbilical trocar site with out difficulty. Irrigation was performed, hemostasis was assured, the trocars were removed. The specimen removal port site is closed with 0' vicryl suture. All the port sites skin is closed with 4-0 monocryl in subcuticular manner. Skin glue applied. Sponge & needle count correct at the end of procedure. The patient tolerated the procedure well and was transferred to the PACU in stable condition. I was present for the entire procedure. Intraop Fluoroscopy: I interpreted cholangiogram while its done and my decisions are based on my interpretation.      Jacqueline Pratt MD MD  Surgical Oncologist  Department of [place] [address]

## 2023-07-15 ENCOUNTER — HOSPITAL ENCOUNTER (OUTPATIENT)
Dept: GENERAL RADIOLOGY | Age: 58
Discharge: HOME | End: 2023-07-15
Payer: COMMERCIAL

## 2023-07-15 ENCOUNTER — HOSPITAL ENCOUNTER (OUTPATIENT)
Age: 58
End: 2023-07-15
Payer: COMMERCIAL

## 2023-07-15 DIAGNOSIS — S30.851A METAL FOREIGN BODY IN ABDOMEN: ICD-10-CM

## 2023-07-15 PROCEDURE — 74018 RADEX ABDOMEN 1 VIEW: CPT

## 2023-08-28 ENCOUNTER — HOSPITAL ENCOUNTER (OUTPATIENT)
Age: 58
Setting detail: OUTPATIENT SURGERY
Discharge: HOME | End: 2023-08-28
Attending: INTERNAL MEDICINE | Admitting: INTERNAL MEDICINE
Payer: COMMERCIAL

## 2023-08-28 ENCOUNTER — ANESTHESIA (OUTPATIENT)
Dept: ENDOSCOPY | Age: 58
End: 2023-08-28
Payer: COMMERCIAL

## 2023-08-28 ENCOUNTER — ANESTHESIA EVENT (OUTPATIENT)
Dept: ENDOSCOPY | Age: 58
End: 2023-08-28
Payer: COMMERCIAL

## 2023-08-28 VITALS
RESPIRATION RATE: 16 BRPM | DIASTOLIC BLOOD PRESSURE: 88 MMHG | BODY MASS INDEX: 35.68 KG/M2 | TEMPERATURE: 98.1 F | SYSTOLIC BLOOD PRESSURE: 147 MMHG | WEIGHT: 222 LBS | OXYGEN SATURATION: 95 % | HEART RATE: 79 BPM | HEIGHT: 66 IN

## 2023-08-28 LAB
GLUCOSE BLD-MCNC: 121 MG/DL (ref 70–99)
PERFORMED ON: ABNORMAL

## 2023-08-28 PROCEDURE — 2580000003 HC RX 258: Performed by: ANESTHESIOLOGY

## 2023-08-28 PROCEDURE — 2720000010 HC SURG SUPPLY STERILE: Performed by: INTERNAL MEDICINE

## 2023-08-28 PROCEDURE — 3700000000 HC ANESTHESIA ATTENDED CARE: Performed by: INTERNAL MEDICINE

## 2023-08-28 PROCEDURE — 6360000002 HC RX W HCPCS: Performed by: NURSE ANESTHETIST, CERTIFIED REGISTERED

## 2023-08-28 PROCEDURE — 7100000011 HC PHASE II RECOVERY - ADDTL 15 MIN: Performed by: INTERNAL MEDICINE

## 2023-08-28 PROCEDURE — 3609155700 HC EGD STENT REMOVAL: Performed by: INTERNAL MEDICINE

## 2023-08-28 PROCEDURE — 7100000010 HC PHASE II RECOVERY - FIRST 15 MIN: Performed by: INTERNAL MEDICINE

## 2023-08-28 PROCEDURE — 2709999900 HC NON-CHARGEABLE SUPPLY: Performed by: INTERNAL MEDICINE

## 2023-08-28 PROCEDURE — 3700000001 HC ADD 15 MINUTES (ANESTHESIA): Performed by: INTERNAL MEDICINE

## 2023-08-28 RX ORDER — LIDOCAINE HYDROCHLORIDE 10 MG/ML
1 INJECTION, SOLUTION EPIDURAL; INFILTRATION; INTRACAUDAL; PERINEURAL
Status: DISCONTINUED | OUTPATIENT
Start: 2023-08-28 | End: 2023-08-28 | Stop reason: HOSPADM

## 2023-08-28 RX ORDER — MIDAZOLAM HYDROCHLORIDE 1 MG/ML
INJECTION INTRAMUSCULAR; INTRAVENOUS PRN
Status: DISCONTINUED | OUTPATIENT
Start: 2023-08-28 | End: 2023-08-28 | Stop reason: SDUPTHER

## 2023-08-28 RX ORDER — SODIUM CHLORIDE 0.9 % (FLUSH) 0.9 %
5-40 SYRINGE (ML) INJECTION EVERY 12 HOURS SCHEDULED
Status: DISCONTINUED | OUTPATIENT
Start: 2023-08-28 | End: 2023-08-28 | Stop reason: HOSPADM

## 2023-08-28 RX ORDER — LIDOCAINE HYDROCHLORIDE 20 MG/ML
INJECTION, SOLUTION INTRAVENOUS PRN
Status: DISCONTINUED | OUTPATIENT
Start: 2023-08-28 | End: 2023-08-28 | Stop reason: SDUPTHER

## 2023-08-28 RX ORDER — SODIUM CHLORIDE, SODIUM LACTATE, POTASSIUM CHLORIDE, CALCIUM CHLORIDE 600; 310; 30; 20 MG/100ML; MG/100ML; MG/100ML; MG/100ML
INJECTION, SOLUTION INTRAVENOUS CONTINUOUS
Status: DISCONTINUED | OUTPATIENT
Start: 2023-08-28 | End: 2023-08-28 | Stop reason: HOSPADM

## 2023-08-28 RX ORDER — TIRZEPATIDE 5 MG/.5ML
INJECTION, SOLUTION SUBCUTANEOUS
COMMUNITY
Start: 2023-06-27

## 2023-08-28 RX ORDER — SODIUM CHLORIDE 0.9 % (FLUSH) 0.9 %
5-40 SYRINGE (ML) INJECTION PRN
Status: DISCONTINUED | OUTPATIENT
Start: 2023-08-28 | End: 2023-08-28 | Stop reason: HOSPADM

## 2023-08-28 RX ORDER — PROPOFOL 10 MG/ML
INJECTION, EMULSION INTRAVENOUS PRN
Status: DISCONTINUED | OUTPATIENT
Start: 2023-08-28 | End: 2023-08-28 | Stop reason: SDUPTHER

## 2023-08-28 RX ADMIN — SODIUM CHLORIDE, POTASSIUM CHLORIDE, SODIUM LACTATE AND CALCIUM CHLORIDE: 600; 310; 30; 20 INJECTION, SOLUTION INTRAVENOUS at 13:33

## 2023-08-28 RX ADMIN — LIDOCAINE HYDROCHLORIDE 50 MG: 20 INJECTION, SOLUTION INTRAVENOUS at 14:17

## 2023-08-28 RX ADMIN — PROPOFOL 150 MG: 10 INJECTION, EMULSION INTRAVENOUS at 14:18

## 2023-08-28 RX ADMIN — MIDAZOLAM HYDROCHLORIDE 2 MG: 2 INJECTION, SOLUTION INTRAMUSCULAR; INTRAVENOUS at 14:14

## 2023-08-28 ASSESSMENT — ENCOUNTER SYMPTOMS: SHORTNESS OF BREATH: 1

## 2023-08-28 NOTE — ANESTHESIA POSTPROCEDURE EVALUATION
Department of Anesthesiology  Postprocedure Note    Patient: Feng Levi  MRN: [id number]  YOB: 1965  Date of evaluation: 8/28/2023      Procedure Summary     Date: 08/28/23 Room / Location: [address] [place] [address]    Anesthesia Start: 2578 Anesthesia Stop: 0030    Procedure: ESOPHAGOGASTRODUODENOSCOPY WITH STENT REMOVAL Diagnosis:       Encounter for fit/adjst of GI appliance and device      Acute cholecystitis      (Encounter for fit/adjst of GI appliance and device [Z46.59])      (Acute cholecystitis [K81.0])    Surgeons: Du Mclain MD Responsible Provider: Blanche Leblanc DO    Anesthesia Type: MAC ASA Status: 3          Anesthesia Type: No value filed.     Chelsie Phase I: Chelsie Score: 10    Chelsie Phase II: Chelsie Score: 10      Anesthesia Post Evaluation    Patient location during evaluation: PACU  Patient participation: complete - patient participated  Level of consciousness: awake and alert  Airway patency: patent  Nausea & Vomiting: no nausea and no vomiting  Cardiovascular status: blood pressure returned to baseline  Respiratory status: acceptable  Hydration status: euvolemic  Pain management: adequate

## 2023-08-28 NOTE — ANESTHESIA PRE PROCEDURE
Department of Anesthesiology  Preprocedure Note       Name:  Jorge Perez   Age:  59 y.o.  :  1965                                          MRN:  [id number]         Date:  2023      Surgeon: Osito Londono):  Du Wheat MD    Procedure: Procedure(s):  ESOPHAGOGASTRODUODENOSCOPY WITH STENT REMOVAL    Medications prior to admission:   Prior to Admission medications    Medication Sig Start Date End Date Taking? Authorizing Provider   potassium chloride (KLOR-CON) 10 MEQ extended release tablet Take by mouth    Historical Provider, MD   ondansetron (ZOFRAN ODT) 4 MG disintegrating tablet Take 1 tablet by mouth every 8 hours as needed for Nausea or Vomiting 21   FERNANDO Courtney   atorvastatin (LIPITOR) 40 MG tablet Take 1 tablet by mouth daily 20   Historical Provider, MD   metFORMIN (GLUCOPHAGE) 500 MG tablet Take 2 tablets by mouth 2 times daily 20   Historical Provider, MD   losartan (COZAAR) 100 MG tablet Take 1 tablet by mouth daily 20   Historical Provider, MD   Semaglutide,0.25 or 0.5MG/DOS, (OZEMPIC, 0.25 OR 0.5 MG/DOSE,) 2 MG/1.5ML SOPN Inject 0.5 mg into the skin once a week 10/7/20   Historical Provider, MD   carvedilol (COREG) 12.5 MG tablet Take 12.5 mg by mouth 2 times daily (with meals)    Historical Provider, MD   potassium chloride 10 MEQ/100ML Infuse 10 mEq intravenously once  Patient not taking: No sig reported    Historical Provider, MD   Mag Aspart-Potassium Aspart (POTASSIUM & MAGNESIUM ASPARTAT) 250-250 MG CAPS Take by mouth  Patient not taking: No sig reported    Historical Provider, MD   naproxen (NAPROSYN) 500 MG tablet Take 1 tablet by mouth 2 times daily  Patient not taking: No sig reported 16   FERNANDO Courtney   aspirin 81 MG EC tablet Take 1 tablet by mouth daily. Patient not taking: No sig reported 3/9/11   Lynda Aldridge MD   esomeprazole (NEXIUM) 40 MG capsule Take 40 mg by mouth every morning (before breakfast).   Patient not taking: No sig

## 2023-08-28 NOTE — PROCEDURES
Endoscopy Note    Patient: Thai Whiting  : 1965  CSN: [id number]    Procedure: Esophagogastroduodenoscopy with foreign body removal    Date:  2023     Surgeon:  Demarco Xavier MD     Referring Physician:  Meghana Tovar    Preoperative Diagnosis:  Encounter for fit/adjst of GI appliance and device [Z46.59]  Acute cholecystitis [K81.0]    Postoperative Diagnosis:  * No post-op diagnosis entered *    Anesthesia:  MAC    EBL: minimal to none. Indications: This is a 59y.o. year old female who presents today with Removal of foreign body. Description of Procedure:  Informed consent was obtained from the patient after explanation of indications, benefits and possible risks and complications of the procedure. The patient was then taken to the endoscopy suite, placed in the left lateral decubitus position and the above IV sedation was administrered. The Olympus video endoscope was passed through the hypopharynx into the esophagus. The scope was advanced all the way to the second portion of the duodenum. The GE junction was at approximately 39 cms. The scope was slowly withdrawn and mucosa was carefully evaluated including a retroflex view of the proximal stomach. Double-pigtail stent was noted in the second part of the duodenum and its end was grasped with a snare and gently removed from the bile duct. The entire stent was intact. No immediate postprocedure complications were noted findings and interventions are described below. The patient was decompressed and the scope was then withdrawn. Gastric or Duodenal ulcer present: No    The patient tolerated the procedure well and was taken to the post anesthesia care unit in good condition. There were no immediate complications. Impression:    Foreign body of the duodenum-biliary stent-removed. Normal esophagus. No hiatal hernia or esophagitis. Normal mucosa of the stomach, D1 and D2.      Recommendations:    Follow-up with me as

## 2024-05-09 ENCOUNTER — HOSPITAL ENCOUNTER (EMERGENCY)
Age: 59
Discharge: HOME | End: 2024-05-09
Attending: STUDENT IN AN ORGANIZED HEALTH CARE EDUCATION/TRAINING PROGRAM
Payer: COMMERCIAL

## 2024-05-09 ENCOUNTER — TELEPHONE (OUTPATIENT)
Dept: ORTHOPEDIC SURGERY | Age: 59
End: 2024-05-09

## 2024-05-09 ENCOUNTER — APPOINTMENT (OUTPATIENT)
Dept: GENERAL RADIOLOGY | Age: 59
End: 2024-05-09
Payer: COMMERCIAL

## 2024-05-09 VITALS
OXYGEN SATURATION: 96 % | BODY MASS INDEX: 36.32 KG/M2 | DIASTOLIC BLOOD PRESSURE: 71 MMHG | HEIGHT: 66 IN | SYSTOLIC BLOOD PRESSURE: 135 MMHG | TEMPERATURE: 98.2 F | WEIGHT: 226 LBS | HEART RATE: 79 BPM | RESPIRATION RATE: 14 BRPM

## 2024-05-09 DIAGNOSIS — S92.502A CLOSED FRACTURE OF PHALANX OF LEFT FIFTH TOE, INITIAL ENCOUNTER: Primary | ICD-10-CM

## 2024-05-09 PROCEDURE — 73630 X-RAY EXAM OF FOOT: CPT

## 2024-05-09 PROCEDURE — 99283 EMERGENCY DEPT VISIT LOW MDM: CPT

## 2024-05-09 ASSESSMENT — PAIN - FUNCTIONAL ASSESSMENT
PAIN_FUNCTIONAL_ASSESSMENT: 0-10
PAIN_FUNCTIONAL_ASSESSMENT: ACTIVITIES ARE NOT PREVENTED

## 2024-05-09 ASSESSMENT — PAIN DESCRIPTION - ORIENTATION: ORIENTATION: LEFT

## 2024-05-09 ASSESSMENT — PAIN DESCRIPTION - DESCRIPTORS: DESCRIPTORS: DULL

## 2024-05-09 ASSESSMENT — PAIN DESCRIPTION - FREQUENCY: FREQUENCY: CONTINUOUS

## 2024-05-09 ASSESSMENT — PAIN DESCRIPTION - PAIN TYPE: TYPE: ACUTE PAIN

## 2024-05-09 ASSESSMENT — PAIN SCALES - GENERAL: PAINLEVEL_OUTOF10: 3

## 2024-05-09 ASSESSMENT — PAIN DESCRIPTION - ONSET: ONSET: ON-GOING

## 2024-05-09 ASSESSMENT — PAIN DESCRIPTION - LOCATION: LOCATION: FOOT;TOE (COMMENT WHICH ONE)

## 2024-05-09 NOTE — DISCHARGE INSTRUCTIONS
Please return to the emergency department if you develop any new, ongoing or worsening symptoms.  We hope you feel better soon!  Please call the number provided to follow-up with our on-call orthopedic doctor

## 2024-05-09 NOTE — ED PROVIDER NOTES
Emergency Department Provider Note  Location: [facility] EMERGENCY DEPARTMENT  2024     Patient Identification  Amena Davis is a 58 y.o. female    Chief Complaint  Foot Injury (Pinky toe kicked chair left foot )      Mode of Arrival  private car    HPI  (History provided by patient)  This is a 58 y.o. female with a PMH significant for DM, CHF, GERD, HTN  presented today for left foot injury.  Patient reports that she actually kicked a chair last night and injured her pinky toe.  States that she has had increasing pain and bruising.  She denies any numbness or tingling.  Denies any ankle pain.  Patient still able to bear weight although painful.      ROS  Review of Systems   All other systems reviewed and are negative.        I have reviewed the following nursing documentation:  Allergies:   Allergies   Allergen Reactions    Lisinopril Cough       Past medical history:  has a past medical history of Cardiomyopathy (HCC), CHF (congestive heart failure) (HCC), DM (diabetes mellitus) (HCC), GERD (gastroesophageal reflux disease), and Hypertension.    Past surgical history:  has a past surgical history that includes  section; Cholecystectomy, laparoscopic (N/A, 2023); ERCP (N/A, 2023); and Upper gastrointestinal endoscopy (N/A, 2023).    Home medications:   Prior to Admission medications    Medication Sig Start Date End Date Taking? Authorizing Provider   MOUNJARO 5 MG/0.5ML SOPN SC injection  23   Wyatt Huitron MD   ondansetron (ZOFRAN ODT) 4 MG disintegrating tablet Take 1 tablet by mouth every 8 hours as needed for Nausea or Vomiting 21   Papo Badillo PA   atorvastatin (LIPITOR) 40 MG tablet Take 1 tablet by mouth daily 20   Wyatt Huitron MD   metFORMIN (GLUCOPHAGE) 500 MG tablet Take 2 tablets by mouth 2 times daily 20   Wyatt Huitron MD   losartan (COZAAR) 100 MG tablet Take 1 tablet by mouth daily 20   Wyatt Huitron MD

## 2024-05-14 ENCOUNTER — OFFICE VISIT (OUTPATIENT)
Dept: ORTHOPEDIC SURGERY | Age: 59
End: 2024-05-14
Payer: COMMERCIAL

## 2024-05-14 VITALS — WEIGHT: 220 LBS | BODY MASS INDEX: 35.36 KG/M2 | HEIGHT: 66 IN

## 2024-05-14 DIAGNOSIS — S92.512A CLOSED DISPLACED FRACTURE OF PROXIMAL PHALANX OF LESSER TOE OF LEFT FOOT, INITIAL ENCOUNTER: Primary | ICD-10-CM

## 2024-05-14 PROCEDURE — 28510 TREATMENT OF TOE FRACTURE: CPT | Performed by: ORTHOPAEDIC SURGERY

## 2024-05-14 PROCEDURE — G8417 CALC BMI ABV UP PARAM F/U: HCPCS | Performed by: ORTHOPAEDIC SURGERY

## 2024-05-14 PROCEDURE — 99203 OFFICE O/P NEW LOW 30 MIN: CPT | Performed by: ORTHOPAEDIC SURGERY

## 2024-05-14 PROCEDURE — 3017F COLORECTAL CA SCREEN DOC REV: CPT | Performed by: ORTHOPAEDIC SURGERY

## 2024-05-14 PROCEDURE — G8427 DOCREV CUR MEDS BY ELIG CLIN: HCPCS | Performed by: ORTHOPAEDIC SURGERY

## 2024-05-14 PROCEDURE — 1036F TOBACCO NON-USER: CPT | Performed by: ORTHOPAEDIC SURGERY

## 2024-05-22 NOTE — PROGRESS NOTES
CHIEF COMPLAINT: Left foot pain/ fifth toe proximal phalanx minimally displaced fracture.    DATE OF INJURY:  2024, DOT 2024    HISTORY:  Ms. Davis 58 y.o.  female presents today for the first visit for evaluation of a left foot injury which occurred when she kicked a chair.  She is complaining of left foot fifth toe pain and swelling.  This is better with elevation and worse with bearing any wt.  The pain is sharp and not radiating. No other complaint. She was seen 1st at [facility], where she was x-rayed and splinted and asked to f/u with orthopaedics.    Past Medical History:   Diagnosis Date    Cardiomyopathy (HCC)     CHF (congestive heart failure) (HCC)     DM (diabetes mellitus) (HCC)     GERD (gastroesophageal reflux disease)     Hypertension        Past Surgical History:   Procedure Laterality Date     SECTION      CHOLECYSTECTOMY, LAPAROSCOPIC N/A 2023    CHOLECYSTECTOMY LAPAROSCOPIC WITH INTRA-OPERATIVE CHOLANGIOGRAM, TRANSCYSTIC CATHETER BILE DUCT EXPLORATION performed by Boone Carrington MD at [facility] OR    ERCP N/A 2023    ERCP DIAGNOSTIC performed by Du Cantu MD at [facility] ENDOSCOPY    UPPER GASTROINTESTINAL ENDOSCOPY N/A 2023    ESOPHAGOGASTRODUODENOSCOPY WITH STENT REMOVAL performed by Du Cantu MD at [facility] ENDOSCOPY       Social History     Socioeconomic History    Marital status:      Spouse name: Not on file    Number of children: Not on file    Years of education: Not on file    Highest education level: Not on file   Occupational History    Not on file   Tobacco Use    Smoking status: Never    Smokeless tobacco: Never   Vaping Use    Vaping Use: Never used   Substance and Sexual Activity    Alcohol use: No    Drug use: No    Sexual activity: Yes   Other Topics Concern    Not on file   Social History Narrative    Not on file     Social Determinants of Health     Financial Resource Strain: Not on file   Food Insecurity: Not on file

## (undated) DEVICE — CANNULATING SPHINCTEROTOME: Brand: TRUETOME DREAMWIRE 44

## (undated) DEVICE — SYSTEM BX CAP BILI RAP EXCHG CAP LOK DEV COMPATIBLE W/ OLY

## (undated) DEVICE — ELECTRODE LAP L36CM PTFE WIRE J HK CLEANCOAT

## (undated) DEVICE — ORGANIZER MED DEV W76XL86CM PCH W25XL28CM FOR ENDOSCP TBL

## (undated) DEVICE — TOWEL,STOP FLAG GOLD N-W: Brand: MEDLINE

## (undated) DEVICE — GENERAL LAPAROSCOPIC: Brand: MEDLINE INDUSTRIES, INC.

## (undated) DEVICE — CANNULA SAMP CO2 AD GRN 7FT CO2 AND 7FT O2 TBNG UNIV CONN

## (undated) DEVICE — BLADE ES ELASTOMERIC COAT INSUL DURABLE BEND UPTO 90DEG

## (undated) DEVICE — Device

## (undated) DEVICE — SINGLE USE DISTAL COVER MAJ-2315: Brand: SINGLE USE DISTAL COVER

## (undated) DEVICE — POSITIONER HD AD W4.5XH8XL9IN HIGHLY RESILIENT FOAM CMFRT

## (undated) DEVICE — TROCAR: Brand: KII FIOS FIRST ENTRY

## (undated) DEVICE — 40583 XL ADVANCED TRENDELENBURG POSITIONING KIT: Brand: 40583 XL ADVANCED TRENDELENBURG POSITIONING KIT

## (undated) DEVICE — SUTURE MCRYL SZ 4-0 L27IN ABSRB UD L19MM PS-2 1/2 CIR PRIM Y426H

## (undated) DEVICE — GLOVE SURG SZ 7 L12IN FNGR THK79MIL GRN LTX FREE

## (undated) DEVICE — APPLICATOR MEDICATED 26 CC SOLUTION HI LT ORNG CHLORAPREP

## (undated) DEVICE — SUTURE SZ 0 27IN 5/8 CIR UR-6  TAPER PT VIOLET ABSRB VICRYL J603H

## (undated) DEVICE — RETRIEVAL BALLOON CATHETER: Brand: EXTRACTOR™ PRO RX

## (undated) DEVICE — ZIMMON NEEDLE KNIFE PAPILLOTOME: Brand: ZIMMON

## (undated) DEVICE — ELECTRODE ENDOSCP L36CM PTFE SPAT CRV DISP CLEANCOAT

## (undated) DEVICE — PMI DISPOSABLE PUNCTURE CLOSURE DEVICE / SUTURE GRASPER: Brand: PMI

## (undated) DEVICE — Z DISCONTINUED NEEDLE HYPO 22GA L1.5IN BLK POLYPR HUB S STL REG BVL STR - SEE COMMENT

## (undated) DEVICE — GLOVE SURG SZ 7 L12IN FNGR THK75MIL WHT LTX POLYMER BEAD

## (undated) DEVICE — GLOVE ORTHO 8   MSG9480

## (undated) DEVICE — CORD ES L10FT MPLR LAP

## (undated) DEVICE — ERBE NESSY®PLATE 170 SPLIT; 168CM²; CABLE 3M: Brand: ERBE

## (undated) DEVICE — KIT,ANTI FOG,W/SPONGE & FLUID,SOFT PACK: Brand: MEDLINE

## (undated) DEVICE — SCISSORS ENDOSCP DIA5MM CRV MPLR CAUT W/ RATCH HNDL

## (undated) DEVICE — SNARE ENDOSCP POLYP SM 2.4 MM 195 CM 13 MM 2.8 MM CAPTIVATOR

## (undated) DEVICE — TROCAR: Brand: KII SLEEVE

## (undated) DEVICE — FOGARTY ARTERIAL EMBOLECTOMY CATHETER 4F 80CM: Brand: FOGARTY